# Patient Record
Sex: MALE | Race: WHITE | Employment: OTHER | URBAN - METROPOLITAN AREA
[De-identification: names, ages, dates, MRNs, and addresses within clinical notes are randomized per-mention and may not be internally consistent; named-entity substitution may affect disease eponyms.]

---

## 2017-02-20 ENCOUNTER — HOSPITAL ENCOUNTER (EMERGENCY)
Age: 72
Discharge: HOME | End: 2017-02-20
Attending: EMERGENCY MEDICINE
Payer: MEDICARE

## 2017-02-20 ENCOUNTER — APPOINTMENT (OUTPATIENT)
Dept: GENERAL RADIOLOGY | Age: 72
End: 2017-02-20
Attending: EMERGENCY MEDICINE
Payer: MEDICARE

## 2017-02-20 VITALS
DIASTOLIC BLOOD PRESSURE: 90 MMHG | SYSTOLIC BLOOD PRESSURE: 155 MMHG | WEIGHT: 175 LBS | TEMPERATURE: 97.3 F | BODY MASS INDEX: 25.92 KG/M2 | HEIGHT: 69 IN | RESPIRATION RATE: 18 BRPM | HEART RATE: 60 BPM | OXYGEN SATURATION: 98 %

## 2017-02-20 DIAGNOSIS — T07.XXXA ABRASIONS OF MULTIPLE SITES: ICD-10-CM

## 2017-02-20 DIAGNOSIS — S63.259A DISLOCATED FINGER, INITIAL ENCOUNTER: Primary | ICD-10-CM

## 2017-02-20 PROCEDURE — 77030008326 HC SPLNT FNGR PLSTL DERY -A

## 2017-02-20 PROCEDURE — 73140 X-RAY EXAM OF FINGER(S): CPT

## 2017-02-20 PROCEDURE — 99284 EMERGENCY DEPT VISIT MOD MDM: CPT

## 2017-02-20 PROCEDURE — 74011250636 HC RX REV CODE- 250/636: Performed by: EMERGENCY MEDICINE

## 2017-02-20 PROCEDURE — 74011000250 HC RX REV CODE- 250

## 2017-02-20 PROCEDURE — 90471 IMMUNIZATION ADMIN: CPT

## 2017-02-20 PROCEDURE — 90715 TDAP VACCINE 7 YRS/> IM: CPT | Performed by: EMERGENCY MEDICINE

## 2017-02-20 PROCEDURE — 75810000303 HC CLSD TRMT  FRACTURE/DISLOCATION W/  ANES

## 2017-02-20 RX ORDER — SIMVASTATIN 10 MG/1
10 TABLET, FILM COATED ORAL
COMMUNITY
End: 2021-07-28

## 2017-02-20 RX ORDER — ASPIRIN 81 MG/1
81 TABLET ORAL DAILY
COMMUNITY
End: 2021-08-07

## 2017-02-20 RX ORDER — LIDOCAINE HYDROCHLORIDE 20 MG/ML
10 INJECTION, SOLUTION INFILTRATION; PERINEURAL
Status: DISCONTINUED | OUTPATIENT
Start: 2017-02-20 | End: 2017-02-20

## 2017-02-20 RX ORDER — LIDOCAINE HYDROCHLORIDE 20 MG/ML
10 INJECTION, SOLUTION EPIDURAL; INFILTRATION; INTRACAUDAL; PERINEURAL
Status: COMPLETED | OUTPATIENT
Start: 2017-02-20 | End: 2017-02-20

## 2017-02-20 RX ORDER — LIDOCAINE HYDROCHLORIDE 20 MG/ML
INJECTION, SOLUTION EPIDURAL; INFILTRATION; INTRACAUDAL; PERINEURAL
Status: COMPLETED
Start: 2017-02-20 | End: 2017-02-20

## 2017-02-20 RX ADMIN — LIDOCAINE HYDROCHLORIDE 200 MG: 20 INJECTION, SOLUTION EPIDURAL; INFILTRATION; INTRACAUDAL; PERINEURAL at 20:37

## 2017-02-20 RX ADMIN — TETANUS TOXOID, REDUCED DIPHTHERIA TOXOID AND ACELLULAR PERTUSSIS VACCINE, ADSORBED 0.5 ML: 5; 2.5; 8; 8; 2.5 SUSPENSION INTRAMUSCULAR at 20:36

## 2017-02-20 NOTE — ED TRIAGE NOTES
Sent here by Pt. First for possible L 3rd digit dislocation. No loss of sensation. + movement. Cap. Refill less than 3 seconds.
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
## 2017-02-21 NOTE — DISCHARGE INSTRUCTIONS
We hope that we have addressed all of your medical concerns. The examination and treatment you received in the Emergency Department were for an emergent problem and were not intended as complete care. It is important that you follow up with your healthcare provider(s) for ongoing care. If your symptoms worsen or do not improve as expected, and you are unable to reach your usual health care provider(s), you should return to the Emergency Department. Today's healthcare is undergoing tremendous change, and patient satisfaction surveys are one of the many tools to assess the quality of medical care. You may receive a survey from the [employer] regarding your experience in the Emergency Department. I hope that your experience has been completely positive, particularly the medical care that I provided. As such, please participate in the survey; anything less than excellent does not meet my expectations or intentions. [facility] and [address] [place] [address] participate in nationally recognized quality of care measures. If your blood pressure is greater than 120/80, as reported below, we urge that you seek medical care to address the potential of high blood pressure, commonly known as hypertension. Hypertension can be hereditary or can be caused by certain medical conditions, pain, stress, or \"white coat syndrome. \"       Please make an appointment with your health care provider(s) for follow up of your Emergency Department visit. VITALS:   Patient Vitals for the past 8 hrs:   Temp Pulse Resp BP SpO2   02/20/17 2128 - 60 18 155/90 98 %   02/20/17 1824 97.3 °F (36.3 °C) (!) 56 16 162/89 97 %          Thank you for allowing us to provide you with medical care today. We realize that you have many choices for your emergency care needs. Please choose us in the future for any continued health care needs. Judy Magaña, [address] [place]: [phone]            No results found for this or any previous visit (from the past 24 hour(s)). Xr 3rd Finger Lt Min 2 V    Result Date: 2/20/2017  INDICATION:  reduction Exam: AP, lateral, oblique views of the left hand. There is near anatomic alignment status post closed reduction of left third digit PIP dislocation. IMPRESSION: Interval reduction of left third digit PIP dislocation. Xr 3rd Finger Lt Min 2 V    Result Date: 2/20/2017  INDICATION: trauma, possible left third digit dislocation. Exam: AP, lateral, oblique views of the left third digit. FINDINGS: The left third digit middle phalanx is medially and dorsally dislocated with respect to the proximal phalanx. No acute fracture is visualized. Osseous structures are diffusely demineralized. IMPRESSION: Left digit PIP dislocation. Dislocated Finger: Care Instructions  Your Care Instructions  When the bones of a finger are forced out of their normal position, it is called a dislocated finger. This can happen when a finger jams or bends backward. It is common during sports. A doctor can put your finger back in its normal position. You probably knew that something was wrong with your finger right away. This is because a dislocated finger usually hurts a lot. And it doesn't look straight. Your doctor may have put a splint on the finger. This will keep it in position while it heals. Your doctor may also recommend exercises to strengthen your finger. Rest and home treatment can also help you get better. If you damaged bones or muscles, you may need more treatment. Follow-up care is a key part of your treatment and safety. Be sure to make and go to all appointments, and call your doctor if you are having problems. It's also a good idea to know your test results and keep a list of the medicines you take. How can you care for yourself at home?   · If your doctor put a splint on your finger, wear it as directed. Do not remove it until your doctor says you can. · Do not do anything that makes the pain worse. · If your finger is swollen, put ice or a cold pack on it for 10 to 20 minutes at a time. Try to do this every 1 to 2 hours for the next 3 days (when you are awake) or until the swelling goes down. Put a thin cloth between the ice and your skin. · Prop up your hand on a pillow when you ice it or anytime you sit or lie down during the next 3 days. Try to keep it above the level of your heart. This will help reduce swelling. · Take your medicines exactly as prescribed. Call your doctor if you think you are having a problem with your medicine. · Ask your doctor if you can take an over-the-counter pain medicine, such as acetaminophen (Tylenol), ibuprofen (Advil, Motrin), or naproxen (Aleve). Be safe with medicines. Read and follow all instructions on the label. · If your doctor recommends exercises, do them as directed. When should you call for help? Call your doctor now or seek immediate medical care if:  · You have signs that your finger may be dislocated again, such as:  ¨ Severe pain. ¨ A finger that looks like a bone is out of position. ¨ Not being able to bend or straighten your finger. · Your finger or hand is cool or pale or changes color. · You cannot feel or move your finger. Watch closely for changes in your health, and be sure to contact your doctor if:  · You do not get better as expected. Where can you learn more? Go to http://awilda-sukhdeep.info/. Enter R117 in the search box to learn more about \"Dislocated Finger: Care Instructions. \"  Current as of: May 23, 2016  Content Version: 11.1  © 0320-5282 [employer]. Care instructions adapted under license by [employer] (which disclaims liability or warranty for this information).  If you have questions about a medical condition or this instruction, always ask your healthcare professional. [address] any warranty or liability for your use of this information. Scrapes (Abrasions): Care Instructions  Your Care Instructions  Scrapes (abrasions) are wounds where your skin has been rubbed or torn off. Most scrapes do not go deep into the skin, but some may remove several layers of skin. Scrapes usually don't bleed much, but they may ooze pinkish fluid. Scrapes on the head or face may appear worse than they are. They may bleed a lot because of the good blood supply to this area. Most scrapes heal well and may not need a bandage. They usually heal within 3 to 7 days. A large, deep scrape may take 1 to 2 weeks or longer to heal. A scab may form on some scrapes. Follow-up care is a key part of your treatment and safety. Be sure to make and go to all appointments, and call your doctor if you are having problems. It's also a good idea to know your test results and keep a list of the medicines you take. How can you care for yourself at home? · If your doctor told you how to care for your wound, follow your doctor's instructions. If you did not get instructions, follow this general advice:  ¨ Wash the scrape with clean water 2 times a day. Don't use hydrogen peroxide or alcohol, which can slow healing. ¨ You may cover the scrape with a thin layer of petroleum jelly, such as Vaseline, and a nonstick bandage. ¨ Apply more petroleum jelly and replace the bandage as needed. · Prop up the injured area on a pillow anytime you sit or lie down during the next 3 days. Try to keep it above the level of your heart. This will help reduce swelling. · Be safe with medicines. Take pain medicines exactly as directed. ¨ If the doctor gave you a prescription medicine for pain, take it as prescribed. ¨ If you are not taking a prescription pain medicine, ask your doctor if you can take an over-the-counter medicine. When should you call for help?   Call your doctor now or seek immediate medical care if:  · You have signs of infection, such as:  ¨ Increased pain, swelling, warmth, or redness around the scrape. ¨ Red streaks leading from the scrape. ¨ Pus draining from the scrape. ¨ A fever. · The scrape starts to bleed, and blood soaks through the bandage. Oozing small amounts of blood is normal.  Watch closely for changes in your health, and be sure to contact your doctor if the scrape is not getting better each day. Where can you learn more? Go to http://awilda-sukhdeep.info/. Enter A374 in the search box to learn more about \"Scrapes (Abrasions): Care Instructions. \"  Current as of: May 27, 2016  Content Version: 11.1  © 6647-5705 [email or web address]. Care instructions adapted under license by [employer] (which disclaims liability or warranty for this information). If you have questions about a medical condition or this instruction, always ask your healthcare professional. [address] any warranty or liability for your use of this information.

## 2017-02-21 NOTE — ED NOTES
Pt 's wounds/abrasions cleansed with wound care cleanser. Pt's 3rd digit of L hand was splinted. Neurovascular intact before and after. Checked by MD.    I have reviewed discharge instructions. Paperwork given by provider and reviewed with patient; opportunity for questions given. Pt confirmed understanding of discharge instructions and of need for follow up with primary care provider. Pt left walking . Pt left with all personal belongings. Pt family/friends are present. Pt is alert and oriented x 4. Pt is medically stable and is in no current distress. Chief complaint was addressed and improved.

## 2021-04-28 ENCOUNTER — TRANSCRIBE ORDER (OUTPATIENT)
Dept: SCHEDULING | Age: 76
End: 2021-04-28

## 2021-04-28 DIAGNOSIS — M17.12 PRIMARY OSTEOARTHRITIS OF LEFT KNEE: Primary | ICD-10-CM

## 2021-05-03 ENCOUNTER — HOSPITAL ENCOUNTER (OUTPATIENT)
Dept: CT IMAGING | Age: 76
Discharge: HOME | End: 2021-05-03
Attending: ORTHOPAEDIC SURGERY
Payer: MEDICARE

## 2021-05-03 DIAGNOSIS — M17.12 PRIMARY OSTEOARTHRITIS OF LEFT KNEE: ICD-10-CM

## 2021-05-03 PROCEDURE — 73700 CT LOWER EXTREMITY W/O DYE: CPT

## 2021-07-12 NOTE — PROGRESS NOTES
Call to Dr. Bull Baltazar office (Cardiology) to request notes from LOV and EKG; to be faxed to [facility]

## 2021-07-26 ENCOUNTER — ANESTHESIA EVENT (OUTPATIENT)
Dept: SURGERY | Age: 76
End: 2021-07-26
Payer: MEDICARE

## 2021-07-28 ENCOUNTER — HOSPITAL ENCOUNTER (OUTPATIENT)
Dept: PREADMISSION TESTING | Age: 76
Discharge: HOME | End: 2021-07-28
Payer: MEDICARE

## 2021-07-28 VITALS
SYSTOLIC BLOOD PRESSURE: 129 MMHG | RESPIRATION RATE: 16 BRPM | OXYGEN SATURATION: 95 % | HEIGHT: 68 IN | HEART RATE: 60 BPM | BODY MASS INDEX: 27.87 KG/M2 | TEMPERATURE: 97.3 F | DIASTOLIC BLOOD PRESSURE: 76 MMHG | WEIGHT: 183.9 LBS

## 2021-07-28 LAB
ALBUMIN SERPL-MCNC: 3.3 G/DL (ref 3.5–5)
ALBUMIN/GLOB SERPL: 0.9 {RATIO} (ref 1.1–2.2)
ALP SERPL-CCNC: 64 U/L (ref 45–117)
ALT SERPL-CCNC: 12 U/L (ref 12–78)
ANION GAP SERPL CALC-SCNC: 2 MMOL/L (ref 5–15)
APPEARANCE UR: CLEAR
AST SERPL-CCNC: 18 U/L (ref 15–37)
BACTERIA URNS QL MICRO: NEGATIVE /HPF
BASOPHILS # BLD: 0 K/UL (ref 0–0.1)
BASOPHILS NFR BLD: 0 % (ref 0–1)
BILIRUB SERPL-MCNC: 0.5 MG/DL (ref 0.2–1)
BILIRUB UR QL: NEGATIVE
BUN SERPL-MCNC: 22 MG/DL (ref 6–20)
BUN/CREAT SERPL: 20 (ref 12–20)
CALCIUM SERPL-MCNC: 8.4 MG/DL (ref 8.5–10.1)
CHLORIDE SERPL-SCNC: 110 MMOL/L (ref 97–108)
CO2 SERPL-SCNC: 29 MMOL/L (ref 21–32)
COLOR UR: NORMAL
CREAT SERPL-MCNC: 1.12 MG/DL (ref 0.7–1.3)
CRP SERPL-MCNC: <0.29 MG/DL (ref 0–0.6)
DIFFERENTIAL METHOD BLD: ABNORMAL
EOSINOPHIL # BLD: 0.1 K/UL (ref 0–0.4)
EOSINOPHIL NFR BLD: 1 % (ref 0–7)
EPITH CASTS URNS QL MICRO: NORMAL /LPF
ERYTHROCYTE [DISTWIDTH] IN BLOOD BY AUTOMATED COUNT: 13.4 % (ref 11.5–14.5)
ERYTHROCYTE [SEDIMENTATION RATE] IN BLOOD: 8 MM/HR (ref 0–20)
EST. AVERAGE GLUCOSE BLD GHB EST-MCNC: 108 MG/DL
GLOBULIN SER CALC-MCNC: 3.5 G/DL (ref 2–4)
GLUCOSE SERPL-MCNC: 69 MG/DL (ref 65–100)
GLUCOSE UR STRIP.AUTO-MCNC: NEGATIVE MG/DL
HBA1C MFR BLD: 5.4 % (ref 4–5.6)
HCT VFR BLD AUTO: 46.1 % (ref 36.6–50.3)
HGB BLD-MCNC: 15.3 G/DL (ref 12.1–17)
HGB UR QL STRIP: NEGATIVE
HYALINE CASTS URNS QL MICRO: NORMAL /LPF (ref 0–5)
IMM GRANULOCYTES # BLD AUTO: 0 K/UL (ref 0–0.04)
IMM GRANULOCYTES NFR BLD AUTO: 0 % (ref 0–0.5)
KETONES UR QL STRIP.AUTO: NEGATIVE MG/DL
LEUKOCYTE ESTERASE UR QL STRIP.AUTO: NEGATIVE
LYMPHOCYTES # BLD: 2.1 K/UL (ref 0.8–3.5)
LYMPHOCYTES NFR BLD: 27 % (ref 12–49)
MCH RBC QN AUTO: 33.8 PG (ref 26–34)
MCHC RBC AUTO-ENTMCNC: 33.2 G/DL (ref 30–36.5)
MCV RBC AUTO: 101.8 FL (ref 80–99)
MONOCYTES # BLD: 0.8 K/UL (ref 0–1)
MONOCYTES NFR BLD: 11 % (ref 5–13)
NEUTS SEG # BLD: 4.7 K/UL (ref 1.8–8)
NEUTS SEG NFR BLD: 61 % (ref 32–75)
NITRITE UR QL STRIP.AUTO: NEGATIVE
NRBC # BLD: 0 K/UL (ref 0–0.01)
NRBC BLD-RTO: 0 PER 100 WBC
PH UR STRIP: 6 [PH] (ref 5–8)
PLATELET # BLD AUTO: 246 K/UL (ref 150–400)
PMV BLD AUTO: 9.4 FL (ref 8.9–12.9)
POTASSIUM SERPL-SCNC: 5 MMOL/L (ref 3.5–5.1)
PROT SERPL-MCNC: 6.8 G/DL (ref 6.4–8.2)
PROT UR STRIP-MCNC: NEGATIVE MG/DL
RBC # BLD AUTO: 4.53 M/UL (ref 4.1–5.7)
RBC #/AREA URNS HPF: NORMAL /HPF (ref 0–5)
SODIUM SERPL-SCNC: 141 MMOL/L (ref 136–145)
SP GR UR REFRACTOMETRY: 1.02 (ref 1–1.03)
UA: UC IF INDICATED,UAUC: NORMAL
UROBILINOGEN UR QL STRIP.AUTO: 0.2 EU/DL (ref 0.2–1)
WBC # BLD AUTO: 7.8 K/UL (ref 4.1–11.1)
WBC URNS QL MICRO: NORMAL /HPF (ref 0–4)

## 2021-07-28 PROCEDURE — 83036 HEMOGLOBIN GLYCOSYLATED A1C: CPT

## 2021-07-28 PROCEDURE — 80053 COMPREHEN METABOLIC PANEL: CPT

## 2021-07-28 PROCEDURE — 85652 RBC SED RATE AUTOMATED: CPT

## 2021-07-28 PROCEDURE — 86140 C-REACTIVE PROTEIN: CPT

## 2021-07-28 PROCEDURE — 36415 COLL VENOUS BLD VENIPUNCTURE: CPT

## 2021-07-28 PROCEDURE — 84466 ASSAY OF TRANSFERRIN: CPT

## 2021-07-28 PROCEDURE — 81001 URINALYSIS AUTO W/SCOPE: CPT

## 2021-07-28 PROCEDURE — 85025 COMPLETE CBC W/AUTO DIFF WBC: CPT

## 2021-07-28 RX ORDER — ACETAMINOPHEN 500 MG
500 TABLET ORAL
COMMUNITY
End: 2021-08-07

## 2021-07-28 RX ORDER — LEVOTHYROXINE SODIUM 25 UG/1
25 TABLET ORAL
COMMUNITY

## 2021-07-28 RX ORDER — CHOLECALCIFEROL (VITAMIN D3) 125 MCG
220 CAPSULE ORAL AS NEEDED
COMMUNITY
End: 2021-08-07

## 2021-07-28 RX ORDER — BISMUTH SUBSALICYLATE 262 MG
1 TABLET,CHEWABLE ORAL DAILY
COMMUNITY

## 2021-07-28 RX ORDER — ROSUVASTATIN CALCIUM 10 MG/1
10 TABLET, COATED ORAL DAILY
COMMUNITY

## 2021-07-28 RX ORDER — IBUPROFEN 400 MG/1
400 TABLET ORAL
COMMUNITY
End: 2021-08-07

## 2021-07-28 RX ORDER — IBUPROFEN, ACETAMINOPHEN 125; 250 MG/1; MG/1
1 TABLET, FILM COATED ORAL AS NEEDED
COMMUNITY
End: 2021-08-07

## 2021-07-28 NOTE — H&P
Preoperative Evaluation                     History and Physical with Surgical Risk Stratification     7/28/2021    CC: left knee pain    HPI:   Malorie Lipscomb is a 67 y.o. male referred for pre-operative evaluation by Dr. Manan Carl for surgery on 8/5/21. Arias Clark notes he has had knee pain for many years but in February the pain became worse. He notes now the pain is \"unbearable\". He notes PT has improved his knees slightly with strength. Pain radiates behind his knee and up into his thigh. Denies swelling or buckling. No falls. He is followed by Dr. Arvin Sadler for his heart health. We have clearance on chart. The patient was evaluated in the surgeon's office and it was determined that the most appropriate plan of care is to proceed with surgical intervention. Patient's PCP Lauren Edmondson MD    Review of Systems     Constitutional: Negative for chills and fever  Throat: Negative for congestion and sore throat  Eyes: Negative for blurred vision and double vision  Respiratory: Negative for cough, shortness of breath and wheezing  Mouth: Negative for loose, broken or chipped teeth. Cardiovascular: Negative for chest pain and palpitations  Gastrointestinal: Negative for abdominal pain, nausea, diarrhea & constipation  Genitourinary: Negative for dysuria and hematuria  Musculoskeletal: Left knee pain  Skin: Negative for rash, open wounds.    Neurological: Negative for dizziness, headaches, tremors  Psychiatric: Negative for anxiety    Inherent Risk of Surgery     Surgical risk:  Intermediate  Intermediate:  Joint Replacement, Spinal surgery, abdominal, thoracic, carotid endarterctomy, prostate, head and neck    Patient Cardiac Risk Assessment     Revised Cardiac Risk Index (RCRI)    Rate if cardiac death, nonfatal MI, nonfatal cardiac arrest by number of risk factor- 0.4%    SHAHEEN/AHA 2007 Guidelines:   1) Surgery Emergency, Non-cardiac -> to surgery  2) If not, look at clinical predictors    Intermediate Minor     Blood Thinner: ASA    METS      EQUAL TO 4 Care for self Walk indoors around house Walk 2-3 blocks on level ground (2-3 mph) Light work around house (dust, dishes)     Other Risk Factors:   Screening for ETOH use:  Done and low risk  Smoking status:  Former   Marijuana Use:  No    Personal or FH of bleeding problems:  No  Personal or FH of blood clots:  No  Personal or FH of anesthesia problems:   No    Pulmonary Risk:  Asthma or COPD:  No  Body mass index is 27.96 kg/m². Known ESTHER:  No    Past Medical, Surgical, Social History     Allergies: No Known Allergies      Current Outpatient Medications:     rosuvastatin (CRESTOR) 10 mg tablet, Take 10 mg by mouth daily. , Disp: , Rfl:     levothyroxine (Euthyrox) 25 mcg tablet, Take 25 mcg by mouth Daily (before breakfast). , Disp: , Rfl:     COQ10, UBIQUINOL, PO, Take 200 mg by mouth., Disp: , Rfl:     multivitamin (ONE A DAY) tablet, Take 1 Tablet by mouth daily. , Disp: , Rfl:     ibuprofen (MOTRIN) 400 mg tablet, Take 400 mg by mouth every six (6) hours as needed for Pain., Disp: , Rfl:     acetaminophen (Tylenol Extra Strength) 500 mg tablet, Take 500 mg by mouth every six (6) hours as needed for Pain., Disp: , Rfl:     ibuprofen-acetaminophen (AdviL Dual Action) 125-250 mg tab, Take 1 Tablet by mouth as needed. , Disp: , Rfl:     naproxen sodium (Aleve) 220 mg cap, Take 220 mg by mouth as needed. , Disp: , Rfl:     aspirin delayed-release 81 mg tablet, Take 81 mg by mouth daily. , Disp: , Rfl:      Past Medical History:   Diagnosis Date    COVID-19 vaccine series completed 2020    High cholesterol     Hypothyroidism      Past Surgical History:   Procedure Laterality Date    HX COLONOSCOPY      HX VASECTOMY       Social History     Tobacco Use    Smoking status: Former Smoker     Types: Cigarettes    Smokeless tobacco: Never Used   Substance Use Topics    Alcohol use:  Yes     Alcohol/week: 3.0 standard drinks Types: 3 Cans of beer per week    Drug use: Never     No family history on file. Objective     Vitals:    07/28/21 0814   BP: 129/76   Pulse: 60   Resp: 16   Temp: 97.3 °F (36.3 °C)   SpO2: 95%   Weight: 83.4 kg (183 lb 14.4 oz)   Height: 5' 8\" (1.727 m)       General Appearance: Alert, Well Appearing and in no distress  Mental Status: Normal mood, behavior, speech and dress  Neck: Normal appearance externally  Ears: External canal no drainage  Nose: Normal external appearance and no drainage   Chest: Clear to auscultation, no wheezes, rales or rhonchi  Heart: Normal rate, regular rhythm, no murmurs, rubs, clicks or gallops  Skin: Normal color, no lesions externally  Abdomen: Not examined  Neuro: Not examined  Musculoskeletal: Gait antalgic     Recent Results (from the past 168 hour(s))   C REACTIVE PROTEIN, QT    Collection Time: 07/28/21  9:10 AM   Result Value Ref Range    C-Reactive protein <0.29 0.00 - 0.60 mg/dL   CBC WITH AUTOMATED DIFF    Collection Time: 07/28/21  9:10 AM   Result Value Ref Range    WBC 7.8 4.1 - 11.1 K/uL    RBC 4.53 4.10 - 5.70 M/uL    HGB 15.3 12.1 - 17.0 g/dL    HCT 46.1 36.6 - 50.3 %    .8 (H) 80.0 - 99.0 FL    MCH 33.8 26.0 - 34.0 PG    MCHC 33.2 30.0 - 36.5 g/dL    RDW 13.4 11.5 - 14.5 %    PLATELET 829 639 - 185 K/uL    MPV 9.4 8.9 - 12.9 FL    NRBC 0.0 0  WBC    ABSOLUTE NRBC 0.00 0.00 - 0.01 K/uL    NEUTROPHILS 61 32 - 75 %    LYMPHOCYTES 27 12 - 49 %    MONOCYTES 11 5 - 13 %    EOSINOPHILS 1 0 - 7 %    BASOPHILS 0 0 - 1 %    IMMATURE GRANULOCYTES 0 0.0 - 0.5 %    ABS. NEUTROPHILS 4.7 1.8 - 8.0 K/UL    ABS. LYMPHOCYTES 2.1 0.8 - 3.5 K/UL    ABS. MONOCYTES 0.8 0.0 - 1.0 K/UL    ABS. EOSINOPHILS 0.1 0.0 - 0.4 K/UL    ABS. BASOPHILS 0.0 0.0 - 0.1 K/UL    ABS. IMM.  GRANS. 0.0 0.00 - 0.04 K/UL    DF AUTOMATED     METABOLIC PANEL, COMPREHENSIVE    Collection Time: 07/28/21  9:10 AM   Result Value Ref Range    Sodium 141 136 - 145 mmol/L    Potassium 5.0 3.5 - 5.1 mmol/L    Chloride 110 (H) 97 - 108 mmol/L    CO2 29 21 - 32 mmol/L    Anion gap 2 (L) 5 - 15 mmol/L    Glucose 69 65 - 100 mg/dL    BUN 22 (H) 6 - 20 MG/DL    Creatinine 1.12 0.70 - 1.30 MG/DL    BUN/Creatinine ratio 20 12 - 20      GFR est AA >60 >60 ml/min/1.73m2    GFR est non-AA >60 >60 ml/min/1.73m2    Calcium 8.4 (L) 8.5 - 10.1 MG/DL    Bilirubin, total 0.5 0.2 - 1.0 MG/DL    ALT (SGPT) 12 12 - 78 U/L    AST (SGOT) 18 15 - 37 U/L    Alk. phosphatase 64 45 - 117 U/L    Protein, total 6.8 6.4 - 8.2 g/dL    Albumin 3.3 (L) 3.5 - 5.0 g/dL    Globulin 3.5 2.0 - 4.0 g/dL    A-G Ratio 0.9 (L) 1.1 - 2.2     HEMOGLOBIN A1C WITH EAG    Collection Time: 07/28/21  9:10 AM   Result Value Ref Range    Hemoglobin A1c 5.4 4.0 - 5.6 %    Est. average glucose 108 mg/dL   CULTURE, MRSA    Collection Time: 07/28/21  9:10 AM    Specimen: Nares; Nasal   Result Value Ref Range    Special Requests: NO SPECIAL REQUESTS      Culture result: MRSA NOT PRESENT      Culture result:        Screening of patient nares for MRSA is for surveillance purposes and, if positive, to facilitate isolation considerations in high risk settings. It is not intended for automatic decolonization interventions per se as regimens are not sufficiently effective to warrant routine use.    SED RATE (ESR)    Collection Time: 07/28/21  9:10 AM   Result Value Ref Range    Sed rate, automated 8 0 - 20 mm/hr   TRANSFERRIN    Collection Time: 07/28/21  9:10 AM   Result Value Ref Range    Transferrin 218 177 - 329 mg/dL   URINALYSIS W/ REFLEX CULTURE    Collection Time: 07/28/21  9:10 AM    Specimen: Urine   Result Value Ref Range    Color YELLOW/STRAW      Appearance CLEAR CLEAR      Specific gravity 1.022 1.003 - 1.030      pH (UA) 6.0 5.0 - 8.0      Protein Negative NEG mg/dL    Glucose Negative NEG mg/dL    Ketone Negative NEG mg/dL    Bilirubin Negative NEG      Blood Negative NEG      Urobilinogen 0.2 0.2 - 1.0 EU/dL    Nitrites Negative NEG      Leukocyte Esterase Negative NEG      WBC 0-4 0 - 4 /hpf    RBC 0-5 0 - 5 /hpf    Epithelial cells FEW FEW /lpf    Bacteria Negative NEG /hpf    UA:UC IF INDICATED CULTURE NOT INDICATED BY UA RESULT CNI      Hyaline cast 0-2 0 - 5 /lpf       Assessment and Plan     Assessment/Plan:   1) OA left Knee       Pre-Operative Evaluation    Plan:  Left knee replacement  Labs and EKG reviewed. MRSA negative    Preoperative Risk Stratification:    Per RCRI, the patient has a 0.4% risk of cardiac death, nonfatal MI, nonfatal cardiac arrest based on no risk factors. Per ACC/AHA guidelines, patient is low risk for a(n) intermediate risk surgery and may proceed to planned surgery with the above noted risk.     Nieves Kim NP

## 2021-07-28 NOTE — PERIOP NOTES
[address] [place] [address] [address], [address] [facility]   MAIN OR                                  [phone]   MAIN PRE OP                          [phone]                                                                                AMBULATORY PRE OP          [phone]  PRE-ADMISSION TESTING    [phone]   Surgery Date: Thursday, 8/5/21       Is surgery arrival time given by surgeon? YES  NO  If NO, [facility] staff will call you between 3 and 7pm the day before your surgery with your arrival time. (If your surgery is on a Monday, we will call you the Friday before.)    Call [phone] after 7pm Monday-Friday if you did not receive this call. INSTRUCTIONS BEFORE YOUR SURGERY   When You  Arrive Arrive at the [address]nd [address] [facility] on the day of your surgery  Have your insurance card, photo ID, and any copayment (if needed)   Food   and   Drink NO food or drink after midnight the night before surgery    This means NO water, gum, mints, coffee, juice, etc.  No alcohol (beer, wine, liquor) 24 hours before and after surgery   Medications to   TAKE   Morning of Surgery MEDICATIONS TO TAKE THE MORNING OF SURGERY WITH A SIP OF WATER:    Euthyrox   Medications  To  STOP      7 days before surgery  Non-Steroidal anti-inflammatory Drugs (NSAID's): for example, Ibuprofen (Advil, Motrin), Naproxen (Aleve)   Aspirin, if taking for pain    Herbal supplements, vitamins, and fish oil   Other:  (Pain medications not listed above, including Tylenol may be taken)  Please stop taking CQ10, multivitamin after 7/29/21 until after surgery. Blood  Thinners  If you take  Aspirin, Plavix, Coumadin, or any blood-thinning or anti-blood clot medicine, talk to the doctor who prescribed the medications for pre-operative instructions.  Please stop taking Aspirin after 7/29/21 until after surgery.    Bathing Clothing  Jewelry  Valuables      If you shower the morning of surgery, please do not apply anything to your skin (lotions, powders, deodorant, or makeup, especially mascara)   Follow Chlorhexidine Care Fusion body wash instructions provided to you during PAT appointment. Begin 3 days prior to surgery.  Do not shave or trim anywhere 24 hours before surgery   Wear your hair loose or down; no pony-tails, buns, or metal hair clips   Wear loose, comfortable, clean clothes   Wear glasses instead of contacts  [employer] money, valuables, and jewelry, including body piercings, at home   If you were given an [employer], bring it on day of surgery. Going Home - or Spending the Night  SAME-DAY SURGERY: You must have a responsible adult drive you home and stay with you 24 hours after surgery   ADMITS: If your doctor is keeping you in the hospital after surgery, leave personal belongings/luggage in your car until you have a hospital room number. Hospital discharge time is 12 noon  Drivers must be here before 12 noon unless you are told differently   Special Instructions   Special Instructions:  · Use Chlorhexidine Care Fusion wash and sponges 3 days prior to surgery as instructed. · Incentive spirometer given with instructions to practice at home and bring back to the hospital on the day of surgery. · [facility] will contact you if your Hemoglobin A1C is greater than 7.5. · Ensure/Glucerna  sample, nutritional information, and Ensure/Glucerna coupon given. · Pain pamphlet and Call Don't Fall reminder reviewed with patient. ·  parking is complimentary Monday - Friday 7 am - 5 pm  · Bring PTA Medication list day of surgery with the last doses taken documented  ·        Follow all instructions so your surgery wont be cancelled. Please, be on time. If a situation occurs and you are delayed the day of surgery, call [phone].     If your physical condition changes (like a fever, cold, flu, etc.) call your surgeon. Home medication(s) reviewed and verified via     LIST      during PAT appointment. The patient was contacted by    IN-PERSON  The patient verbalizes understanding of all instructions and    DOES NOT   need reinforcement.

## 2021-07-29 LAB
BACTERIA SPEC CULT: NORMAL
BACTERIA SPEC CULT: NORMAL
SERVICE CMNT-IMP: NORMAL
TRANSFERRIN SERPL-MCNC: 218 MG/DL (ref 177–329)

## 2021-08-05 ENCOUNTER — ANESTHESIA (OUTPATIENT)
Dept: SURGERY | Age: 76
End: 2021-08-05
Payer: MEDICARE

## 2021-08-05 ENCOUNTER — APPOINTMENT (OUTPATIENT)
Dept: GENERAL RADIOLOGY | Age: 76
End: 2021-08-05
Attending: PHYSICIAN ASSISTANT
Payer: MEDICARE

## 2021-08-05 ENCOUNTER — HOSPITAL ENCOUNTER (OUTPATIENT)
Age: 76
Setting detail: OBSERVATION
Discharge: HOME | End: 2021-08-07
Attending: ORTHOPAEDIC SURGERY | Admitting: ORTHOPAEDIC SURGERY
Payer: MEDICARE

## 2021-08-05 DIAGNOSIS — M17.12 ARTHRITIS OF LEFT KNEE: Primary | ICD-10-CM

## 2021-08-05 PROCEDURE — 74011000250 HC RX REV CODE- 250: Performed by: ANESTHESIOLOGY

## 2021-08-05 PROCEDURE — 74011250637 HC RX REV CODE- 250/637: Performed by: ORTHOPAEDIC SURGERY

## 2021-08-05 PROCEDURE — 77030040393 HC DRSG OPTIFOAM GENT MDII -B: Performed by: ORTHOPAEDIC SURGERY

## 2021-08-05 PROCEDURE — 99218 HC RM OBSERVATION: CPT

## 2021-08-05 PROCEDURE — 77030040922 HC BLNKT HYPOTHRM STRY -A

## 2021-08-05 PROCEDURE — 77030018723 HC ELCTRD BLD COVD -A: Performed by: ORTHOPAEDIC SURGERY

## 2021-08-05 PROCEDURE — 74011000250 HC RX REV CODE- 250: Performed by: ORTHOPAEDIC SURGERY

## 2021-08-05 PROCEDURE — 77030029830 HC FEM INST CKPNT DISP STRY -B: Performed by: ORTHOPAEDIC SURGERY

## 2021-08-05 PROCEDURE — 74011250636 HC RX REV CODE- 250/636: Performed by: ANESTHESIOLOGY

## 2021-08-05 PROCEDURE — 77030040361 HC SLV COMPR DVT MDII -B

## 2021-08-05 PROCEDURE — C1776 JOINT DEVICE (IMPLANTABLE): HCPCS | Performed by: ORTHOPAEDIC SURGERY

## 2021-08-05 PROCEDURE — 77030020263 HC SOL INJ SOD CL0.9% LFCR 1000ML: Performed by: ORTHOPAEDIC SURGERY

## 2021-08-05 PROCEDURE — 74011250636 HC RX REV CODE- 250/636: Performed by: ORTHOPAEDIC SURGERY

## 2021-08-05 PROCEDURE — 77030028907 HC WRP KNEE WO BGS SOLM -B

## 2021-08-05 PROCEDURE — 77030035236 HC SUT PDS STRATFX BARB J&J -B: Performed by: ORTHOPAEDIC SURGERY

## 2021-08-05 PROCEDURE — 74011000250 HC RX REV CODE- 250: Performed by: PHYSICIAN ASSISTANT

## 2021-08-05 PROCEDURE — 74011250637 HC RX REV CODE- 250/637: Performed by: ANESTHESIOLOGY

## 2021-08-05 PROCEDURE — 74011000250 HC RX REV CODE- 250: Performed by: NURSE ANESTHETIST, CERTIFIED REGISTERED

## 2021-08-05 PROCEDURE — 74011250636 HC RX REV CODE- 250/636: Performed by: NURSE ANESTHETIST, CERTIFIED REGISTERED

## 2021-08-05 PROCEDURE — 74011250637 HC RX REV CODE- 250/637: Performed by: PHYSICIAN ASSISTANT

## 2021-08-05 PROCEDURE — 77030002933 HC SUT MCRYL J&J -A: Performed by: ORTHOPAEDIC SURGERY

## 2021-08-05 PROCEDURE — 76030000020 HC AMB SURG 1.5 TO 2 HR INTENSV-TIER 1: Performed by: ORTHOPAEDIC SURGERY

## 2021-08-05 PROCEDURE — 76210000036 HC AMBSU PH I REC 1.5 TO 2 HR: Performed by: ORTHOPAEDIC SURGERY

## 2021-08-05 PROCEDURE — 73560 X-RAY EXAM OF KNEE 1 OR 2: CPT

## 2021-08-05 PROCEDURE — 97116 GAIT TRAINING THERAPY: CPT

## 2021-08-05 PROCEDURE — 77030007866 HC KT SPN ANES BBMI -B: Performed by: ANESTHESIOLOGY

## 2021-08-05 PROCEDURE — 64447 NJX AA&/STRD FEMORAL NRV IMG: CPT | Performed by: ANESTHESIOLOGY

## 2021-08-05 PROCEDURE — 77030031139 HC SUT VCRL2 J&J -A: Performed by: ORTHOPAEDIC SURGERY

## 2021-08-05 PROCEDURE — 97161 PT EVAL LOW COMPLEX 20 MIN: CPT

## 2021-08-05 PROCEDURE — 76060000063 HC AMB SURG ANES 1.5 TO 2 HR: Performed by: ORTHOPAEDIC SURGERY

## 2021-08-05 PROCEDURE — 2709999900 HC NON-CHARGEABLE SUPPLY: Performed by: ORTHOPAEDIC SURGERY

## 2021-08-05 PROCEDURE — 77030003601 HC NDL NRV BLK BBMI -A: Performed by: ANESTHESIOLOGY

## 2021-08-05 PROCEDURE — 64445 NJX AA&/STRD SCIATIC NRV IMG: CPT | Performed by: ANESTHESIOLOGY

## 2021-08-05 PROCEDURE — 77030029820: Performed by: ORTHOPAEDIC SURGERY

## 2021-08-05 PROCEDURE — 77030038149 HC BLD SAW SAG STRY -D: Performed by: ORTHOPAEDIC SURGERY

## 2021-08-05 PROCEDURE — 77030041680 HC PNCL ELECSURG SMK EVAC CNMD -B: Performed by: ORTHOPAEDIC SURGERY

## 2021-08-05 PROCEDURE — 77030029829 HC TIB INST CKPNT DISP STRY -B: Performed by: ORTHOPAEDIC SURGERY

## 2021-08-05 PROCEDURE — 74011250636 HC RX REV CODE- 250/636: Performed by: PHYSICIAN ASSISTANT

## 2021-08-05 PROCEDURE — 77030006835 HC BLD SAW SAG STRY -B: Performed by: ORTHOPAEDIC SURGERY

## 2021-08-05 DEVICE — CRUCIATE RETAINING FEMORAL
Type: IMPLANTABLE DEVICE | Site: KNEE | Status: FUNCTIONAL
Brand: TRIATHLON

## 2021-08-05 DEVICE — IMPLANTABLE DEVICE: Type: IMPLANTABLE DEVICE | Site: KNEE | Status: FUNCTIONAL

## 2021-08-05 DEVICE — TIBIAL COMPONENT
Type: IMPLANTABLE DEVICE | Site: KNEE | Status: FUNCTIONAL
Brand: TRIATHLON

## 2021-08-05 DEVICE — PATELLA
Type: IMPLANTABLE DEVICE | Site: KNEE | Status: FUNCTIONAL
Brand: TRIATHLON

## 2021-08-05 DEVICE — KNEE K2 TOT HEMI ADV CMTLS -- IMPL CAPPED K2: Type: IMPLANTABLE DEVICE | Site: KNEE | Status: FUNCTIONAL

## 2021-08-05 RX ORDER — NALOXONE HYDROCHLORIDE 0.4 MG/ML
0.4 INJECTION, SOLUTION INTRAMUSCULAR; INTRAVENOUS; SUBCUTANEOUS AS NEEDED
Status: DISCONTINUED | OUTPATIENT
Start: 2021-08-05 | End: 2021-08-07 | Stop reason: HOSPADM

## 2021-08-05 RX ORDER — SODIUM CHLORIDE, SODIUM LACTATE, POTASSIUM CHLORIDE, CALCIUM CHLORIDE 600; 310; 30; 20 MG/100ML; MG/100ML; MG/100ML; MG/100ML
125 INJECTION, SOLUTION INTRAVENOUS CONTINUOUS
Status: DISCONTINUED | OUTPATIENT
Start: 2021-08-05 | End: 2021-08-05 | Stop reason: HOSPADM

## 2021-08-05 RX ORDER — IBUPROFEN 800 MG/1
800 TABLET ORAL
Qty: 50 TABLET | Refills: 2 | Status: ON HOLD | OUTPATIENT
Start: 2021-08-05 | End: 2022-04-22

## 2021-08-05 RX ORDER — SODIUM CHLORIDE 0.9 % (FLUSH) 0.9 %
5-40 SYRINGE (ML) INJECTION EVERY 8 HOURS
Status: DISCONTINUED | OUTPATIENT
Start: 2021-08-05 | End: 2021-08-05 | Stop reason: HOSPADM

## 2021-08-05 RX ORDER — SODIUM CHLORIDE 0.9 % (FLUSH) 0.9 %
5-40 SYRINGE (ML) INJECTION EVERY 8 HOURS
Status: DISCONTINUED | OUTPATIENT
Start: 2021-08-05 | End: 2021-08-07 | Stop reason: HOSPADM

## 2021-08-05 RX ORDER — FAMOTIDINE 20 MG/1
20 TABLET, FILM COATED ORAL 2 TIMES DAILY
Status: DISCONTINUED | OUTPATIENT
Start: 2021-08-05 | End: 2021-08-07 | Stop reason: HOSPADM

## 2021-08-05 RX ORDER — DIPHENHYDRAMINE HYDROCHLORIDE 50 MG/ML
12.5 INJECTION, SOLUTION INTRAMUSCULAR; INTRAVENOUS
Status: ACTIVE | OUTPATIENT
Start: 2021-08-05 | End: 2021-08-06

## 2021-08-05 RX ORDER — POVIDONE-IODINE 10 %
SOLUTION, NON-ORAL TOPICAL AS NEEDED
Status: DISCONTINUED | OUTPATIENT
Start: 2021-08-05 | End: 2021-08-05 | Stop reason: HOSPADM

## 2021-08-05 RX ORDER — ACETAMINOPHEN 500 MG
975 TABLET ORAL
Qty: 60 TABLET | Refills: 1 | Status: SHIPPED | OUTPATIENT
Start: 2021-08-05

## 2021-08-05 RX ORDER — ONDANSETRON 2 MG/ML
4 INJECTION INTRAMUSCULAR; INTRAVENOUS
Status: ACTIVE | OUTPATIENT
Start: 2021-08-05 | End: 2021-08-06

## 2021-08-05 RX ORDER — MIDAZOLAM HYDROCHLORIDE 1 MG/ML
INJECTION, SOLUTION INTRAMUSCULAR; INTRAVENOUS AS NEEDED
Status: DISCONTINUED | OUTPATIENT
Start: 2021-08-05 | End: 2021-08-05 | Stop reason: HOSPADM

## 2021-08-05 RX ORDER — CELECOXIB 100 MG/1
200 CAPSULE ORAL 2 TIMES DAILY
Status: DISCONTINUED | OUTPATIENT
Start: 2021-08-05 | End: 2021-08-07 | Stop reason: HOSPADM

## 2021-08-05 RX ORDER — OXYCODONE HYDROCHLORIDE 5 MG/1
5 TABLET ORAL
Status: DISCONTINUED | OUTPATIENT
Start: 2021-08-05 | End: 2021-08-07 | Stop reason: HOSPADM

## 2021-08-05 RX ORDER — ONDANSETRON 2 MG/ML
4 INJECTION INTRAMUSCULAR; INTRAVENOUS AS NEEDED
Status: DISCONTINUED | OUTPATIENT
Start: 2021-08-05 | End: 2021-08-05 | Stop reason: HOSPADM

## 2021-08-05 RX ORDER — AMOXICILLIN 250 MG
1 CAPSULE ORAL 2 TIMES DAILY
Status: DISCONTINUED | OUTPATIENT
Start: 2021-08-05 | End: 2021-08-07 | Stop reason: HOSPADM

## 2021-08-05 RX ORDER — SODIUM CHLORIDE 0.9 % (FLUSH) 0.9 %
5-40 SYRINGE (ML) INJECTION AS NEEDED
Status: DISCONTINUED | OUTPATIENT
Start: 2021-08-05 | End: 2021-08-05 | Stop reason: HOSPADM

## 2021-08-05 RX ORDER — CHOLECALCIFEROL (VITAMIN D3) 125 MCG
200 CAPSULE ORAL DAILY
Status: DISCONTINUED | OUTPATIENT
Start: 2021-08-06 | End: 2021-08-07 | Stop reason: HOSPADM

## 2021-08-05 RX ORDER — ALBUTEROL SULFATE 0.83 MG/ML
2.5 SOLUTION RESPIRATORY (INHALATION) AS NEEDED
Status: DISCONTINUED | OUTPATIENT
Start: 2021-08-05 | End: 2021-08-05 | Stop reason: HOSPADM

## 2021-08-05 RX ORDER — PREGABALIN 75 MG/1
75 CAPSULE ORAL ONCE
Status: COMPLETED | OUTPATIENT
Start: 2021-08-05 | End: 2021-08-05

## 2021-08-05 RX ORDER — LEVOTHYROXINE SODIUM 25 UG/1
25 TABLET ORAL
Status: DISCONTINUED | OUTPATIENT
Start: 2021-08-06 | End: 2021-08-07 | Stop reason: HOSPADM

## 2021-08-05 RX ORDER — DIPHENHYDRAMINE HYDROCHLORIDE 50 MG/ML
12.5 INJECTION, SOLUTION INTRAMUSCULAR; INTRAVENOUS AS NEEDED
Status: DISCONTINUED | OUTPATIENT
Start: 2021-08-05 | End: 2021-08-05 | Stop reason: HOSPADM

## 2021-08-05 RX ORDER — SODIUM CHLORIDE 9 MG/ML
125 INJECTION, SOLUTION INTRAVENOUS CONTINUOUS
Status: DISPENSED | OUTPATIENT
Start: 2021-08-05 | End: 2021-08-06

## 2021-08-05 RX ORDER — PHENYLEPHRINE HCL IN 0.9% NACL 0.4MG/10ML
SYRINGE (ML) INTRAVENOUS AS NEEDED
Status: DISCONTINUED | OUTPATIENT
Start: 2021-08-05 | End: 2021-08-05 | Stop reason: HOSPADM

## 2021-08-05 RX ORDER — FENTANYL CITRATE 50 UG/ML
25 INJECTION, SOLUTION INTRAMUSCULAR; INTRAVENOUS
Status: DISCONTINUED | OUTPATIENT
Start: 2021-08-05 | End: 2021-08-05 | Stop reason: HOSPADM

## 2021-08-05 RX ORDER — OXYCODONE HYDROCHLORIDE 5 MG/1
10 TABLET ORAL
Status: DISCONTINUED | OUTPATIENT
Start: 2021-08-05 | End: 2021-08-07 | Stop reason: HOSPADM

## 2021-08-05 RX ORDER — TRANEXAMIC ACID 100 MG/ML
INJECTION, SOLUTION INTRAVENOUS AS NEEDED
Status: DISCONTINUED | OUTPATIENT
Start: 2021-08-05 | End: 2021-08-05 | Stop reason: HOSPADM

## 2021-08-05 RX ORDER — NALOXONE HYDROCHLORIDE 0.4 MG/ML
0.04 INJECTION, SOLUTION INTRAMUSCULAR; INTRAVENOUS; SUBCUTANEOUS
Status: DISCONTINUED | OUTPATIENT
Start: 2021-08-05 | End: 2021-08-05 | Stop reason: HOSPADM

## 2021-08-05 RX ORDER — ASPIRIN 81 MG/1
81 TABLET ORAL 2 TIMES DAILY
Status: DISCONTINUED | OUTPATIENT
Start: 2021-08-05 | End: 2021-08-07 | Stop reason: HOSPADM

## 2021-08-05 RX ORDER — OXYCODONE HYDROCHLORIDE 5 MG/1
5 TABLET ORAL
Status: DISCONTINUED | OUTPATIENT
Start: 2021-08-05 | End: 2021-08-05 | Stop reason: HOSPADM

## 2021-08-05 RX ORDER — BUPIVACAINE HYDROCHLORIDE 5 MG/ML
INJECTION, SOLUTION EPIDURAL; INTRACAUDAL AS NEEDED
Status: DISCONTINUED | OUTPATIENT
Start: 2021-08-05 | End: 2021-08-05 | Stop reason: HOSPADM

## 2021-08-05 RX ORDER — OXYCODONE HYDROCHLORIDE 5 MG/1
5 TABLET ORAL
Qty: 42 TABLET | Refills: 0 | Status: SHIPPED | OUTPATIENT
Start: 2021-08-05 | End: 2021-08-12

## 2021-08-05 RX ORDER — LIDOCAINE HYDROCHLORIDE 10 MG/ML
0.1 INJECTION, SOLUTION EPIDURAL; INFILTRATION; INTRACAUDAL; PERINEURAL AS NEEDED
Status: DISCONTINUED | OUTPATIENT
Start: 2021-08-05 | End: 2021-08-05 | Stop reason: HOSPADM

## 2021-08-05 RX ORDER — ASPIRIN 81 MG/1
81 TABLET ORAL 2 TIMES DAILY
Qty: 60 TABLET | Refills: 0 | Status: ON HOLD | OUTPATIENT
Start: 2021-08-05 | End: 2022-04-22

## 2021-08-05 RX ORDER — ROSUVASTATIN CALCIUM 10 MG/1
10 TABLET, COATED ORAL DAILY
Status: DISCONTINUED | OUTPATIENT
Start: 2021-08-06 | End: 2021-08-07 | Stop reason: HOSPADM

## 2021-08-05 RX ORDER — PROPOFOL 10 MG/ML
INJECTION, EMULSION INTRAVENOUS
Status: DISCONTINUED | OUTPATIENT
Start: 2021-08-05 | End: 2021-08-05 | Stop reason: HOSPADM

## 2021-08-05 RX ORDER — SODIUM CHLORIDE 0.9 % (FLUSH) 0.9 %
5-40 SYRINGE (ML) INJECTION AS NEEDED
Status: DISCONTINUED | OUTPATIENT
Start: 2021-08-05 | End: 2021-08-07 | Stop reason: HOSPADM

## 2021-08-05 RX ORDER — ACETAMINOPHEN 325 MG/1
975 TABLET ORAL ONCE
Status: COMPLETED | OUTPATIENT
Start: 2021-08-05 | End: 2021-08-05

## 2021-08-05 RX ORDER — THERA TABS 400 MCG
1 TAB ORAL DAILY
Status: DISCONTINUED | OUTPATIENT
Start: 2021-08-06 | End: 2021-08-07 | Stop reason: HOSPADM

## 2021-08-05 RX ORDER — TRAMADOL HYDROCHLORIDE 50 MG/1
50 TABLET ORAL
Qty: 28 TABLET | Refills: 0 | Status: SHIPPED | OUTPATIENT
Start: 2021-08-05 | End: 2021-08-12

## 2021-08-05 RX ORDER — ROPIVACAINE HYDROCHLORIDE 2 MG/ML
INJECTION, SOLUTION EPIDURAL; INFILTRATION; PERINEURAL AS NEEDED
Status: DISCONTINUED | OUTPATIENT
Start: 2021-08-05 | End: 2021-08-05 | Stop reason: HOSPADM

## 2021-08-05 RX ORDER — FENTANYL CITRATE 50 UG/ML
INJECTION, SOLUTION INTRAMUSCULAR; INTRAVENOUS AS NEEDED
Status: DISCONTINUED | OUTPATIENT
Start: 2021-08-05 | End: 2021-08-05 | Stop reason: HOSPADM

## 2021-08-05 RX ORDER — ONDANSETRON 8 MG/1
4 TABLET, ORALLY DISINTEGRATING ORAL
Qty: 30 TABLET | Refills: 0 | Status: ON HOLD | OUTPATIENT
Start: 2021-08-05 | End: 2022-04-22

## 2021-08-05 RX ORDER — DIPHENHYDRAMINE HYDROCHLORIDE 50 MG/ML
INJECTION, SOLUTION INTRAMUSCULAR; INTRAVENOUS AS NEEDED
Status: DISCONTINUED | OUTPATIENT
Start: 2021-08-05 | End: 2021-08-05 | Stop reason: HOSPADM

## 2021-08-05 RX ORDER — OXYCODONE HCL 10 MG/1
10 TABLET, FILM COATED, EXTENDED RELEASE ORAL ONCE
Status: COMPLETED | OUTPATIENT
Start: 2021-08-05 | End: 2021-08-05

## 2021-08-05 RX ORDER — FLUMAZENIL 0.1 MG/ML
0.2 INJECTION INTRAVENOUS
Status: DISCONTINUED | OUTPATIENT
Start: 2021-08-05 | End: 2021-08-05 | Stop reason: HOSPADM

## 2021-08-05 RX ORDER — POLYETHYLENE GLYCOL 3350 17 G/17G
17 POWDER, FOR SOLUTION ORAL DAILY
Status: DISCONTINUED | OUTPATIENT
Start: 2021-08-06 | End: 2021-08-07 | Stop reason: HOSPADM

## 2021-08-05 RX ORDER — EPHEDRINE SULFATE/0.9% NACL/PF 50 MG/5 ML
SYRINGE (ML) INTRAVENOUS AS NEEDED
Status: DISCONTINUED | OUTPATIENT
Start: 2021-08-05 | End: 2021-08-05 | Stop reason: HOSPADM

## 2021-08-05 RX ORDER — ACETAMINOPHEN 325 MG/1
650 TABLET ORAL EVERY 6 HOURS
Status: DISCONTINUED | OUTPATIENT
Start: 2021-08-05 | End: 2021-08-07 | Stop reason: HOSPADM

## 2021-08-05 RX ORDER — DEXAMETHASONE SODIUM PHOSPHATE 4 MG/ML
INJECTION, SOLUTION INTRA-ARTICULAR; INTRALESIONAL; INTRAMUSCULAR; INTRAVENOUS; SOFT TISSUE AS NEEDED
Status: DISCONTINUED | OUTPATIENT
Start: 2021-08-05 | End: 2021-08-05 | Stop reason: HOSPADM

## 2021-08-05 RX ORDER — PREGABALIN 75 MG/1
75 CAPSULE ORAL ONCE
Status: DISCONTINUED | OUTPATIENT
Start: 2021-08-05 | End: 2021-08-05 | Stop reason: SDUPTHER

## 2021-08-05 RX ORDER — FACIAL-BODY WIPES
10 EACH TOPICAL DAILY PRN
Status: DISCONTINUED | OUTPATIENT
Start: 2021-08-07 | End: 2021-08-07 | Stop reason: HOSPADM

## 2021-08-05 RX ORDER — HYDROMORPHONE HYDROCHLORIDE 1 MG/ML
0.5 INJECTION, SOLUTION INTRAMUSCULAR; INTRAVENOUS; SUBCUTANEOUS
Status: ACTIVE | OUTPATIENT
Start: 2021-08-05 | End: 2021-08-06

## 2021-08-05 RX ORDER — HYDROMORPHONE HYDROCHLORIDE 1 MG/ML
.25-1 INJECTION, SOLUTION INTRAMUSCULAR; INTRAVENOUS; SUBCUTANEOUS
Status: DISCONTINUED | OUTPATIENT
Start: 2021-08-05 | End: 2021-08-05 | Stop reason: HOSPADM

## 2021-08-05 RX ADMIN — OXYCODONE HYDROCHLORIDE 10 MG: 10 TABLET, FILM COATED, EXTENDED RELEASE ORAL at 06:14

## 2021-08-05 RX ADMIN — ACETAMINOPHEN 650 MG: 325 TABLET ORAL at 18:34

## 2021-08-05 RX ADMIN — Medication 80 MCG: at 08:23

## 2021-08-05 RX ADMIN — ACETAMINOPHEN 975 MG: 325 TABLET ORAL at 06:14

## 2021-08-05 RX ADMIN — CELECOXIB 200 MG: 100 CAPSULE ORAL at 18:34

## 2021-08-05 RX ADMIN — SODIUM CHLORIDE, POTASSIUM CHLORIDE, SODIUM LACTATE AND CALCIUM CHLORIDE: 600; 310; 30; 20 INJECTION, SOLUTION INTRAVENOUS at 07:00

## 2021-08-05 RX ADMIN — TRANEXAMIC ACID 1 G: 100 INJECTION, SOLUTION INTRAVENOUS at 07:40

## 2021-08-05 RX ADMIN — CEFAZOLIN SODIUM 2 G: 1 POWDER, FOR SOLUTION INTRAMUSCULAR; INTRAVENOUS at 07:35

## 2021-08-05 RX ADMIN — Medication 10 MG: at 07:46

## 2021-08-05 RX ADMIN — ROPIVACAINE HYDROCHLORIDE 20 ML: 2 INJECTION, SOLUTION EPIDURAL; INFILTRATION at 07:07

## 2021-08-05 RX ADMIN — FAMOTIDINE 20 MG: 20 TABLET ORAL at 18:34

## 2021-08-05 RX ADMIN — ASPIRIN 81 MG: 81 TABLET, COATED ORAL at 13:54

## 2021-08-05 RX ADMIN — DOCUSATE SODIUM 50MG AND SENNOSIDES 8.6MG 1 TABLET: 8.6; 5 TABLET, FILM COATED ORAL at 13:54

## 2021-08-05 RX ADMIN — Medication 10 MG: at 07:44

## 2021-08-05 RX ADMIN — TRANEXAMIC ACID 1 G: 100 INJECTION, SOLUTION INTRAVENOUS at 08:50

## 2021-08-05 RX ADMIN — ACETAMINOPHEN 650 MG: 325 TABLET ORAL at 23:42

## 2021-08-05 RX ADMIN — CEFAZOLIN 2 G: 1 INJECTION, POWDER, FOR SOLUTION INTRAMUSCULAR; INTRAVENOUS at 23:42

## 2021-08-05 RX ADMIN — ASPIRIN 81 MG: 81 TABLET, COATED ORAL at 18:34

## 2021-08-05 RX ADMIN — ACETAMINOPHEN 650 MG: 325 TABLET ORAL at 13:54

## 2021-08-05 RX ADMIN — DEXAMETHASONE SODIUM PHOSPHATE 10 MG: 4 INJECTION, SOLUTION INTRAMUSCULAR; INTRAVENOUS at 08:24

## 2021-08-05 RX ADMIN — MIDAZOLAM 2 MG: 1 INJECTION, SOLUTION INTRAMUSCULAR; INTRAVENOUS at 07:02

## 2021-08-05 RX ADMIN — SODIUM CHLORIDE 125 ML/HR: 9 INJECTION, SOLUTION INTRAVENOUS at 12:01

## 2021-08-05 RX ADMIN — DIPHENHYDRAMINE HYDROCHLORIDE 25 MG: 50 INJECTION, SOLUTION INTRAMUSCULAR; INTRAVENOUS at 08:14

## 2021-08-05 RX ADMIN — Medication 10 ML: at 23:42

## 2021-08-05 RX ADMIN — PREGABALIN 75 MG: 75 CAPSULE ORAL at 06:14

## 2021-08-05 RX ADMIN — Medication 120 MCG: at 07:39

## 2021-08-05 RX ADMIN — BUPIVACAINE HYDROCHLORIDE 2.2 ML: 5 INJECTION, SOLUTION EPIDURAL; INTRACAUDAL; PERINEURAL at 07:19

## 2021-08-05 RX ADMIN — DOCUSATE SODIUM 50MG AND SENNOSIDES 8.6MG 1 TABLET: 8.6; 5 TABLET, FILM COATED ORAL at 18:34

## 2021-08-05 RX ADMIN — CEFAZOLIN 2 G: 1 INJECTION, POWDER, FOR SOLUTION INTRAMUSCULAR; INTRAVENOUS at 13:54

## 2021-08-05 RX ADMIN — PROPOFOL 120 MCG/KG/MIN: 10 INJECTION, EMULSION INTRAVENOUS at 07:35

## 2021-08-05 RX ADMIN — ROPIVACAINE HYDROCHLORIDE 20 ML: 2 INJECTION, SOLUTION EPIDURAL; INFILTRATION at 07:12

## 2021-08-05 RX ADMIN — FENTANYL CITRATE 50 MCG: 50 INJECTION, SOLUTION INTRAMUSCULAR; INTRAVENOUS at 07:02

## 2021-08-05 RX ADMIN — FAMOTIDINE 20 MG: 20 TABLET ORAL at 13:54

## 2021-08-05 RX ADMIN — FENTANYL CITRATE 50 MCG: 50 INJECTION, SOLUTION INTRAMUSCULAR; INTRAVENOUS at 07:12

## 2021-08-05 NOTE — ANESTHESIA PROCEDURE NOTES
Spinal Block    Start time: 8/5/2021 7:13 AM  End time: 8/5/2021 7:19 AM  Performed by: Lawrence Campbell MD  Authorized by: Lawrence Campbell MD     Pre-procedure:   Indications: at surgeon's request and primary anesthetic  Preanesthetic Checklist: patient identified, risks and benefits discussed, anesthesia consent, site marked, patient being monitored and timeout performed    Timeout Time: 07:13 EDT          Spinal Block:   Patient Position:  Seated  Prep Region:  Lumbar  Prep: chlorhexidine      Location:  L3-4  Technique:  Single shot        Needle:   Needle Type:  Pencan  Needle Gauge:  25 G  Attempts:  1      Events: CSF confirmed, no blood with aspiration and no paresthesia        Assessment:  Insertion:  Uncomplicated  Patient tolerance:  Patient tolerated the procedure well with no immediate complications

## 2021-08-05 NOTE — PERIOP NOTES
TRANSFER - OUT REPORT:    Verbal report given to DONOAVN Corrigan(name) on Ron Da Silva  being transferred to [facility](unit) for routine progression of care       Report consisted of patients Situation, Background, Assessment and   Recommendations(SBAR). Information from the following report(s) SBAR, Kardex, Intake/Output, MAR and Cardiac Rhythm RSR was reviewed with the receiving nurse. Lines:   Peripheral IV 08/05/21 Posterior;Right Forearm (Active)   Site Assessment Clean, dry, & intact 08/05/21 0925   Phlebitis Assessment 0 08/05/21 0925   Infiltration Assessment 0 08/05/21 0925   Dressing Status Clean, dry, & intact 08/05/21 0925   Dressing Type Transparent 08/05/21 0925   Hub Color/Line Status Pink;Capped; Patent 08/05/21 0925   Action Taken Open ports on tubing capped 08/05/21 0925   Alcohol Cap Used No 08/05/21 0925        Opportunity for questions and clarification was provided.       Patient transported with:   Registered Nurse

## 2021-08-05 NOTE — DISCHARGE INSTRUCTIONS
TOTAL KNEE DISCHARGE INSTRUCTIONS      Patient: Christi Siemens MRN: [id number]  SSN: [id number]              Please take the time to review the following instructions before you leave the hospital and use them as guidelines during your recovery from surgery. If you have any questions you may contact my office at [phone]  After business hours or during the weekend you can contact me through [address] or text / call at [phone] (cell phone) for emergency's. Please use the office number during regular business hours. SPECIAL INSTRUCTIONS :   1. Full extension at the knee is the most important aspect of your range of motion. Avoid placing a pillow or bump behind the knee. Rather, place the heel up on a bump or pillow and allow gravity to help straighten the knee. 2. You may weight bear as tolerated on the knee and during the day you should bend the knee as much as possible. 3. Drainage from the incision more than 4 days from surgery is concerning. Contact my office if there is any question [phone].   4. You may contact me directly through [employer] if there are specific questions or text / call using my cell number [phone]. DRESSING :     Post-op Dressings : This should be removed by physical therapy or you may remove this yourself 7 days after the date of your surgery. If there is no drainage, then a simple dressing may be used or no dressing at all. Other dressing options can be purchased over the counter at a local pharmacy or medical supply vendor. A porous adhesive dressing such as pictured above can be purchased online ([address] [facility] [address]) or at your local [facility] or [employer]. You only need to keep the incision covered for 7 days after showers. A dressing may be used for longer if there are issues with clothing clinging to the incision. Showering/ Bathing: You may shower with the Post-op dressing in place.  This is left in place for 7 days following discharge from the hospital. If your incision is dry without drainage you may shower following your discharge home. After 7 days your dressing should be removed for showering. It is fine to have water run over the incision. Do not vigorously scrub your incision. Apply a clean, dry dressing after you have dried your incision. Do not take a bath or get into a swimming pool / Lazara Rede until you follow up with Dr. Josh Her. Do not soak your incision under water. If there is continued drainage or you are concerned contact Dr Sosa Milford office prior to showering [phone] ext [phone] . Diet:  You may advance to your regular diet as tolerated. Increase your clear liquid intake for the next 2-3 days. Medication:      1. You will be given prescriptions for pain medication when you are discharged from the hospital. The side effects of these medications can be substantial and the narcotic medications are not mandatory. You may substitute these medications with Tylenol or Alleve / Motrin. 2. Please use the medications as prescribed. Pain medications may cause constipation- Colace twice daily and Miralax one scoop daily while taking the narcotic medication should help prevent constipation. Please discuss with your local pharmacist regarding increasing this dosage if constipation persists. Other possible side effects of pain medication are dizziness, headache, nausea, vomiting, and urinary retention. Discontinue the pain medication if you develop itching, rash, shortness of breath, or difficulties swallowing. If these symptoms become severe or are not relieved by discontinuing the medication, you should seek immediate medical attention. 3. Refills of pain medication are authorized during office hours only (8 AM- 5 PM  Monday thru Friday). Many of these medication will require you or a family member to pick-up a physical prescription at the office.    4. Medications other than antiinflammatories will not be called into the pharmacy after business hours. 5. You may resume the medication(s) you were taking prior to your surgery. Narcotics may change the effects of some antidepressant medication(s). If you have any questions about possible interactions between your regular medications and the pain medication, you should ask the pharmacist or contact the prescribing physician. 6. If you have constipation which is not improved by oral stool softeners then a Ducolax suppository should be purchased over the counter. 7. Continue the blood thinner (Aspirin or Lovenox) for a total of 30 days following surgery. Follow up appointment:    Please call our office at [phone] for your follow up appointment. This should be scheduled 14 days following the date of surgery. You should also have a scheduled appointment for physical therapy following surgery. Physical Therapy / Nursing:    Physical Therapy following surgery will be arranged as an outpatient or at home. They have specific instructions for rehab and wound care. It is fine to have physical therapy remove your dressing at 7 days following surgery. Returning to work:    Normal return to work is 6-12 weeks following surgery. Depending on your progression following surgery and specific job duties you may take longer for a full return to work. DRIVING    You should not return to driving until you are off all opioid pain medications and able to safely and quickly apply the brakes. This is normally 2-6 weeks for left sided surgery and 2-8 weeks for right sided surgery. Important Signs and Symptoms:    If any of the following signs or symptoms occur, you should contact Dr. Elvia Kwon office.   Please be advised if a problem arises which you feel requires immediate medical attention or you are unable to contact Dr. Elvia Kwon office you should seek immediate medical attention at the ER or other health care facility you have access to.    1. A sudden increase in swelling and/or redness or warmth at the area your surgery was performed which isnt relieved by rest, ice, and elevation. 2. Oral temperature greater than 101 degrees for 12 hours or more which isnt relieved by an increase in fluid intake and taking 2 Tylenol every 4-6 hours. 3. Excessive drainage from your incisions, or drainage which hasnt stopped by 72 hours after your surgery. 4. Fever, chills, shortness of breath, chest pain, nausea, vomiting or other signs and symptoms which are of concern to you. YOUR TOTAL JOINT REPLACEMENT  FREQUENTLY ASKED QUESTIONS   What should I take for pain?  o You will be discharged with four medications for pain (Oxycodone, Tramadol, Ibuprofen and Tylenol). These may vary slightly depending on what you were taking in the hospital.   - 1st Line - Tylenol Arthritis Strength - 325-650 mg every 4 hours (scheduled for the 1st 24 hours)  - 2nd Line -  Ibuprofen 400 (2 tabs) - 800 (4 tabs) mg every 8 hours  (scheduled for the 1st 24 hours)  - 3rd Line - Oxycodone 5 mg (1-2 tablets every 4-6 hrs)  - 4th Line - Tramadol 50 mg (1-2 tablets every 4-6 hours) - take these between Oxycodone doses if your pain is not alleviated.  When should I call for advice regarding my pain?  o If your pain is still uncontrolled after being on the regimen above for at least 12 hours, please call the office [phone] or text / call my cell after hours [phone].  Can I get refills?  o Opioid refills are provided for the first 2-6 weeks following surgery. o Use Tylenol 500 mg along with Aleve 220mg twice daily or Motrin 200-800mg every 4-6 hours during the daytime hours after two weeks. - After two weeks, I suggest the opioid pain medications be used only 1 hour prior to your physical therapy appointment and 1 hour before sleeping at night. Use Tylenol and Ibuprofen at other times during the day.    - Keep in mind that you will need to discontinue opioids before you resume driving.  Is swelling normal?  o Almost everyone has some degree of swelling following surgery. o Following hip and knee replacement surgery, swelling can be normal below the incision for the first few weeks. - This swelling peaks around 5-7 days after surgery. - It is not unusual to have some bruising about the back of the thigh, calf, ankle, and foot.  What should I do for the swelling?  o Keep the limb elevated above the level of your heart - 'Toes above Nose'. o Apply compression socks (knee high for total knees and up to the mid-thigh for total hips). o Use the ice packs that you are discharged home with several times a day for the first several weeks.  How long should I remain on blood thinners following surgery? o 30 days   Which blood thinners will I be on? Can I take them with Tylenol?  o  Aspirin 81 mg twice daily - these should be taken with meals and can be used with Tylenol. In certain instances, you may be sent home on Lovenox for 30 days. o For short periods of time (30 days), aspirin and anti-inflammatories (i.e. Aleve, Motrin / Advil / ibuprofen, diclofenac, etc. can be taken together).  When can I drive?  o Once you have stopped using regular narcotic pain medications (Oxycodone, Percocet, Lortab, Norco etc.) and can safely apply the brakes without hesitation, (emergency braking).  When can I shower?  o You may shower immediately if your Optifoam bandage is dry and without discharge. The Optifoam dressing should be removed 7 days following surgery, after which you may continue to shower.  o No submersion of the incision, bathing or swimming for 14 days following surgery or until cleared by Dr Jennie Mitchell.  Can I remove this dressing?  o Yes, this is removed just like removing a band aid.  o If you are concerned, this can be removed by your therapist.   [facility] What do I do with the dressing when I shower?  o The Optifoam dressing is waterproof and you may shower with it. o The incision is sealed with Dermabond, a biologic skin glue, which also serves as a watertight seal. If your incision is draining, it is no longer considered to be watertight - you should contact our office prior to showering if you experience any drainage.  Which dressing should I purchase after I remove my Optifoam?  o An occlusive dressing which covers your entire incision. This does not have to be waterproof, but will need to be removed when you shower and then replaced. (Example Only)   How active should I be following surgery? o Progress activities in moderation and at your own pace.   o Walking room to room in your house is encouraged. o Walk each day and set progressive goals with small increments (1st week - ?block of walking, 2nd week - 1 block, 3rd week - 2 blocks, etc.)   Will I need help at home?  o You will likely need a caretaker who should be available for the first week following surgery. It is fine for family members to work during the day, as long as they are available by phone. o Planning ahead makes coming home from the hospital a much easier transition.  How long will my surgery take?  o On average, total joint replacement takes approximately 1-2 hour.   o The entire process, including pre-op and post-op care can last as long as 4- 5 hours before you are transferred to your room. o stroke, pulmonary embolism (a clot going from the legs to the lungs), and even death with surgery.  Will I be given antibiotics? Will I need antibiotics at discharge?  o Antibiotics will be given to you both before and after your procedure. To further minimize the risk of infection, we have streamlined the surgical procedure to take less time in the operating room.    o You do not require antibiotics following surgery.       Please do not hesitate to contact me through [employer] or by text / call me at [phone] (cell phone) for questions following surgery - MD Gisella Villegas [facility], MD  Cell [phone]  Behzad Jaimes PA-C  Cell [phone]  Medical Assistants: Kavon Palmer [phone]                     DISCHARGE SUMMARY from your Nurse      PATIENT INSTRUCTIONS    After general anesthesia or intravenous sedation, for 24 hours or while taking prescription Narcotics:  · Limit your activities  · Do not drive and operate hazardous machinery  · Do not make important personal or business decisions  · Do  not drink alcoholic beverages  · If you have not urinated within 8 hours after discharge, please contact your surgeon on call. Report the following to your surgeon:  · Excessive pain, swelling, redness or odor of or around the surgical area  · Temperature over 100.5  · Nausea and vomiting lasting longer than 4 hours or if unable to take medications  · Any signs of decreased circulation or nerve impairment to extremity: change in color, persistent  numbness, tingling, coldness or increase pain  · Any questions      GOOD HELP TO FIGHT AN INFECTION  Here are a few tip to help reduce the chance of getting an infection after surgery:   Wash Your Hands   Good handwashing is the most important thing you and your caregiver can do.  Wash before and after caring for any wounds. Dry your hand with a clean towel.  Wash with soap and water for at least 20 seconds. A TIP: sing the \"Happy Birthday\" song through one time while washing to help with the timing.  Use a hand  in between washings.  Shower   When your surgeon says it is OK to take a shower, use a new bar of antibacterial soap (if that is what you use, and keep that bar of soap ONLY for your use), or antibacterial body wash.  Use a clean wash cloth or sponge when you bathe.  Dry off with a clean towel  after every bath - be careful around any wounds, skin staples, sutures or surgical glue over/on wounds.    Do not enter swimming pools, hot tubs, lakes, rivers and/or ocean until wounds are healed and your doctor/surgeon says it is OK.  Use Clean Sheets   Sleep on freshly laundered sheets after your surgery.  Keep the surgery site covered with a clean, dry bandage (if instructed to do so). If the bandage becomes soiled, reapply a new, dry, clean bandage.  Do not allow pets to sleep with you while your wound is healing.  Lifestyle Modification and Controlling Your Blood Sugar   Smoking slows wound healing. Stop smoking and limit exposure to second-hand smoke.  High blood sugar slows wound healing. Eat a well-balanced diet to provide proper nutrition while healing   Monitor your blood sugar (if you are a diabetic) and take your medications as you are suppose to so you can control you blood sugar after surgery. COUGH AND DEEP BREATHE    Breathing deeply and coughing are very important exercises to do after surgery. Deep breathing and coughing open the little air tubes and air sacks in your lungs. You take deep breaths every day. You may not even notice - it is just something you do when you sigh or yawn. It is a natural exercise you do to keep these air passages open. After surgery, take deep breaths and cough, on purpose. DIRECTIONS:  · Take 10 to 15 slow deep breaths every hour while awake. · Breathe in deeply, and hold it for 2 seconds. · Exhale slowly through puckered lips, like blowing up a balloon. · After every 4th or 5th deep breath, hug your pillow to your chest or belly and give a hard, deep cough. Yes, it will probably hurt. But doing this exercise is a very important part of healing after surgery. Take your pain medicine to help you do this exercise without too much pain. Coughing and deep breathing help prevent bronchitis and pneumonia after surgery. If you had chest or belly surgery, use a pillow as a \"hug christel\" and hold it tightly to your chest or belly when you cough.        ANKLE PUMPS    Ankle pumps increase the circulation of oxygenated blood to your lower extremities and decrease your risk for circulation problems such as blood clots. They also stretch the muscles, tendons and ligaments in your foot and ankle, and prevent joint contracture in the ankle and foot, especially after surgeries on the legs. It is important to do ankle pump exercises regularly after surgery because immobility increases your risk for developing a blood clot. Your doctor may also have you take an Aspirin for the next few days as well. If your doctor did not ask you to take an Aspirin, consult with him before starting Aspirin therapy on your own. The exercise is quite simple. · Slowly point your foot forward, feeling the muscles on the top of your lower leg stretch, and hold this position for 5 seconds. · Next, pull your foot back toward you as far as possible, stretching the calf muscles, and hold that position for 5 seconds. · Repeat with the other foot. · Perform 10 repetitions every hour while awake for both ankles if possible (down and then up with the foot once is one repetition). You should feel gentle stretching of the muscles in your lower leg when doing this exercise. If you feel pain, or your range of motion is limited, don't push too hard. Only go the limit your joint and muscles will let you go. If you have increasing pain, progressively worsening leg warmth or swelling, STOP the exercise and call your doctor. MEDICATION AND   SIDE EFFECT GUIDE    The [facility] MEDICATION AND SIDE EFFECT GUIDE was provided to the PATIENT AND CARE PROVIDER. Information provided includes instruction about drug purpose and common side effects for the following medications:   · Zofran  · Oxycodone  · Tramadol        These are general instructions for a healthy lifestyle:    *   Please give a list of your current medications to your Primary Care Provider.   *   Please update this list whenever your medications are discontinued, doses are changed, or new medications (including over-the-counter products) are added. *   Please carry medication information at all times in case of emergency situations. About Smoking  No smoking / No tobacco products  Avoid exposure to second hand smoke     Surgeon General's Warning:  Quitting smoking now greatly reduces serious risk to your health. Obesity, smoking, and sedentary lifestyle greatly increases your risk for illness and disease. A healthy diet, regular physical exercise & weight monitoring are important for maintaining a healthy lifestyle. Congestive Heart Failure  You may be retaining fluid if you have a history of heart failure or if you experience any of the following symptoms:  Weight gain of 3 pounds or more overnight or 5 pounds in a week, increased swelling in your hands or feet or shortness of breath while lying flat in bed. Please call your doctor as soon as you notice any of these symptoms; do not wait until your next office visit. Don't wait more than five minutes to call 911 - MINUTES MATTER! Fast action can save your life. Calling 911 is almost always the fastest way to get lifesaving treatment. Emergency Medical Services staff can begin treatment when they arrive -- up to an hour sooner than if someone gets to the hospital by car. Learning About Coronavirus [phone])  Coronavirus [phone]): Overview  What is coronavirus (COVID-19)? The coronavirus disease (COVID-19) is caused by a virus. It is an illness that was first found in [place], [place], in December 2019. It has since spread worldwide. The virus can cause fever, cough, and trouble breathing. In severe cases, it can cause pneumonia and make it hard to breathe without help. It can cause death. Coronaviruses are a large group of viruses. They cause the common cold.  They also cause more serious illnesses like Middle East respiratory syndrome (MERS) and severe acute respiratory syndrome (SARS). COVID-19 is caused by a novel coronavirus. That means it's a new type that has not been seen in people before. This virus spreads person-to-person through droplets from coughing and sneezing. It can also spread when you are close to someone who is infected. And it can spread when you touch something that has the virus on it, such as a doorknob or a tabletop. What can you do to protect yourself from coronavirus (COVID-19)? The best way to protect yourself from getting sick is to:  · Avoid areas where there is an outbreak. · Avoid contact with people who may be infected. · Wash your hands often with soap or alcohol-based hand sanitizers. · Avoid crowds and try to stay at least 6 feet away from other people. · Wash your hands often, especially after you cough or sneeze. Use soap and water, and scrub for at least 20 seconds. If soap and water aren't available, use an alcohol-based hand . · Avoid touching your mouth, nose, and eyes. What can you do to avoid spreading the virus to others? To help avoid spreading the virus to others:  · Cover your mouth with a tissue when you cough or sneeze. Then throw the tissue in the trash. · Use a disinfectant to clean things that you touch often. · Stay home if you are sick or have been exposed to the virus. Don't go to school, work, or public areas. And don't use public transportation. · If you are sick:  ? Leave your home only if you need to get medical care. But call the doctor's office first so they know you're coming. And wear a face mask, if you have one.  ? If you have a face mask, wear it whenever you're around other people. It can help stop the spread of the virus when you cough or sneeze. ? Clean and disinfect your home every day. Use household  and disinfectant wipes or sprays. Take special care to clean things that you grab with your hands.  These include doorknobs, remote controls, phones, and handles on your refrigerator and microwave. And don't forget countertops, tabletops, bathrooms, and computer keyboards. When to call for help  Call 911 anytime you think you may need emergency care. For example, call if:  · You have severe trouble breathing. (You can't talk at all.)  · You have constant chest pain or pressure. · You are severely dizzy or lightheaded. · You are confused or can't think clearly. · Your face and lips have a blue color. · You pass out (lose consciousness) or are very hard to wake up. Call your doctor now if you develop symptoms such as:  · Shortness of breath. · Fever. · Cough. If you need to get care, call ahead to the doctor's office for instructions before you go. Make sure you wear a face mask, if you have one, to prevent exposing other people to the virus. Where can you get the latest information? The following health organizations are tracking and studying this virus. Their websites contain the most up-to-date information. [employer] also learn what to do if you think you may have been exposed to the virus. · U.S. Centers for Disease Control and Prevention (CDC): The CDC provides updated news about the disease and travel advice. The website also tells you how to prevent the spread of infection. www.cdc.gov  · World Health Organization [facility]): WHO offers information about the virus outbreaks. WHO also has travel advice. www.who.int  Current as of: April 1, 2020               Content Version: 12.4  © 2006-2020 [employer]. Care instructions adapted under license by your healthcare professional. If you have questions about a medical condition or this instruction, always ask your healthcare professional. [address] any warranty or liability for your use of this information. The discharge information has been reviewed with the spouse. Any questions and concerns from the spouse have been addressed. The spouse verbalized understanding.         CONTENTS FOUND IN [place] DISCHARGE ENVELOPE:  [x]     Surgeon and Hospital Discharge Instructions  [x]     [facility] Surgical Services Care Provider Card  [x]     Medication & Side Effect Guide            (your newly prescribed medications have been marked/highlighted showing the most common side effects from   the classes of drugs on your prescriptions)  [x]     Medication Prescription(s) x 3 ( [x] These have been sent electronically to your pharmacy by your surgeon,   - OR -       your surgeon has already provided these to you during a previous/pre-op office visit)  []     [address]  []     Physical Therapy Prescription  []     Follow-up Appointment Cards  []     Surgery-related Pictures/Media  []     Pain block and/or block with On-Q Catheter from Anesthesia Service (information included in your instructions above)  []     Medical device information sheets/pamphlets from their    []     School/work excuse note. []     /parent work excuse note. The following personal items collected during your admission are returned to you:   Dental Appliance: Dental Appliances: None  Vision: Visual Aid: Glasses  Hearing Aid:    Jewelry: Jewelry: None  Clothing: Clothing: Pants, Shirt, Undergarments, Footwear  Other Valuables: Other Valuables: Cell Phone, Money (comment), Money clip (Given to his wife)  Valuables sent to safe:   . .... Clement Fly Clement Fly Anesthesia Discharge Instructions    After general anesthesia or intervenous sedation, for 24 hours or while taking prescription Narcotics:  · Limit your activities  · Do not drive or operate hazardous machinery  · If you have not urinated within 8 hours after discharge, please contact your surgeon or on-call physician.   · Do not make important personal or business decisions  · Do not drink alcoholic beverages      Report the following to your surgeon:  · Excessive pain, swelling, redness or odor of or around the surgical area  · Temperature over 100.5 degrees  · Nausea and vomiting lasting longer than 4 hours or if unable to take medication  · Any signs of decreased circulation or nerve impairment to extremity:  Change in color, persistent numbness, tingling, coldness or increased pain. · Any questions that you might have that are not covered by the discharge instructions        Please remember you have had anesthesia today and will need to take deep breaths and cough to help prevent any lung complications. Please splint any incision you have as shown by nurse and cough and deep breath at least 5 times every 2 hours while you are awake. If given an incentive spirometer please use it every 2 hours while awake. Do 10 times for each use.     If you have sleep apnea and use a CPAP machine    Please use your CPAP when you nap after taking your pain medicine as well as at bedtime    Please do ankle and knee movements as directed and get up and move around as your MD allows to help prevent blood clots    If you have been given an ice bag   Please remember ice bag or cooling appliance should never be placed on bare skin

## 2021-08-05 NOTE — ANESTHESIA POSTPROCEDURE EVALUATION
Procedure(s):  LEFT TOTAL KNEE ARTHROPLASTY MAKOPLASTY. spinal, regional    Anesthesia Post Evaluation      Multimodal analgesia: multimodal analgesia used between 6 hours prior to anesthesia start to PACU discharge  Patient location during evaluation: bedside  Patient participation: complete - patient participated  Level of consciousness: awake  Pain management: adequate  Airway patency: patent  Anesthetic complications: no  Cardiovascular status: acceptable  Respiratory status: acceptable  Hydration status: acceptable        INITIAL Post-op Vital signs:   Vitals Value Taken Time   /68 08/05/21 1045   Temp 36.4 °C (97.6 °F) 08/05/21 1032   Pulse 58 08/05/21 1049   Resp 14 08/05/21 1049   SpO2 92 % 08/05/21 1049   Vitals shown include unvalidated device data.

## 2021-08-05 NOTE — ANESTHESIA PROCEDURE NOTES
Peripheral Block    Start time: 8/5/2021 7:02 AM  End time: 8/5/2021 7:12 AM  Performed by: Linda Santacruz MD  Authorized by: Linda Santacruz MD       Pre-procedure: Indications: at surgeon's request and post-op pain management    Preanesthetic Checklist: patient identified, risks and benefits discussed, site marked, timeout performed, anesthesia consent given and patient being monitored    Timeout Time: 07:02 EDT          Block Type:   Block Type:  Femoral single shot and popliteal  Laterality:  Left  Monitoring:  Continuous pulse ox, frequent vital sign checks, heart rate, responsive to questions and oxygen  Injection Technique:  Single shot  Procedures: ultrasound guided and nerve stimulator    Patient Position: supine  Prep: chlorhexidine    Needle Type:  Stimuplex  Needle Gauge:  22 G  Needle Localization:  Nerve stimulator and ultrasound guidance    Assessment:  Number of attempts:  1  Injection Assessment:  Incremental injection every 5 mL, local visualized surrounding nerve on ultrasound, negative aspiration for blood, no paresthesia and no intravascular symptoms  Patient tolerance:  Patient tolerated the procedure well with no immediate complications  Popliteal/Sciatic block performed with nerve stimulation and landmark identification. Needle used was 4\" stimuplex 21g. 20cc 0.2% ropivacaine injected intermittently with negative aspiration.

## 2021-08-05 NOTE — ANESTHESIA PREPROCEDURE EVALUATION
Relevant Problems   No relevant active problems       Anesthetic History   No history of anesthetic complications            Review of Systems / Medical History  Patient summary reviewed and pertinent labs reviewed    Pulmonary  Within defined limits                 Neuro/Psych   Within defined limits           Cardiovascular              Hyperlipidemia    Exercise tolerance: >4 METS  Comments: H/o syncopal episodes now with loop recorder-cleared, no arrhythmias.    EF 60%   GI/Hepatic/Renal  Within defined limits              Endo/Other      Hypothyroidism: well controlled  Arthritis     Other Findings              Physical Exam    Airway  Mallampati: II  TM Distance: 4 - 6 cm  Neck ROM: decreased range of motion   Mouth opening: Normal     Cardiovascular    Rhythm: regular  Rate: normal         Dental    Dentition: Upper dentition intact and Lower dentition intact     Pulmonary  Breath sounds clear to auscultation               Abdominal         Other Findings            Anesthetic Plan    ASA: 2  Anesthesia type: spinal and regional - femoral single shot and popliteal fossa block      Post-op pain plan if not by surgeon: peripheral nerve block single    Induction: Intravenous  Anesthetic plan and risks discussed with: Patient

## 2021-08-05 NOTE — OP NOTES
OPERATIVE REPORT     Admit Date: 8/5/2021  Admit Diagnosis: Primary osteoarthritis of left knee [M17.12]    Date of Procedure: 8/5/2021   Preoperative Diagnosis: Primary osteoarthritis of left knee [M17.12]  Postoperative Diagnosis: * No post-op diagnosis entered *    Procedure: Procedure(s):  LEFT TOTAL KNEE ARTHROPLASTY MAKOPLASTY  Surgeon: Kamari Mitchell MD  Assistant(s): Kasey Ball PA-C  Anesthesia: Regional   Estimated Blood Loss: 300cc  Specimens: * No specimens in log *   Complications: None       INDICATIONS:   The patient is a 67 y.o., male who has complained of a long history of knee pain. The patient  has failed conservative treatment and presents for definitive operative care. Informed consent obtained including a discussion of the risks and benefits, which include, but are not limited to, bleeding, infection, neurovascular damage, wound complications, pain and stiffness in the knee, periprosthetic loosening, fracture dislocation and DVT, the patient consented for the procedure. DESCRIPTION OF PROCEDURE:        The patient was seen in the preoperative holding area. The patient was positively identified. The limb was initialed,  questions were answered. The patient was given Ancef preop for an antibiotic. The patient was subsequently taken to the operating room. The patient underwent spinal anesthesia. The patient was positioned in the supine position. All bony prominences were well padded. The limb was prepped and draped in a sterile fashion. The appropriate pause for safety was performed. A mid-line incision was created. Utilizing an incision from above the superior pole of the patella distally to the tibial tubercle. The incision was taken down through the skin and subcutaneous tissue until the retinaculum could be identified. This was sharply incised utilizing a medial parapatellar incision. The femoral array was placed at the superior portion of the patella.  The patellar was everted, a planar resurfacing was performed and the drill guide was placed with the appropriate rotation. The medial-based soft tissue was then retracted as well as well as the lateral tissue. Radha pins were placed within the incision for thetibial array (one hand breadth proximal to the superior pole of the patella). The femoral and tibial trackers were placed. The hip center or rotation was established. Registration was performed on the femur and tibia. Osteophytes were removed. The knee was balanced in both flexion and extension with force applied. The computer model of implants and position was verified. Once this was complete the MAKOplasty robot was positioned. Standard cuts were made for the tibia first. The tibial cut was removed. The remaining femoral cuts were made with the robot. The blade was changed and the medial meniscus was removed. The tibial preparation was completed. Including removal of residual medial and lateral mensci. Tibial baseplate placement and keel preparation were performed along with drill holes for the tibial baseplate. Final bony osteophytes were removed and the meniscal rim was removed. The knee was injected with 60cc of Morphine / Ketoralac and Lidocaine. Trial implants were placed and range of motion along with overall fit was established with very good integrity of the MCL noted. The radha pins and trackers were removed and accounted for prior to closure. All the bony surfaces were irrigated. The tibia was placed first, then the poly, residual osteophytes were removed and then the femur. Finally, the patella was placed and press-fit with the clamp / impaction. There was normal tracking of the patella at the conclusion of the case. The knee was very stable after it was taken through a full range of motion. The wound was closed with 0 Vicryl and then number 2 Quill proximally.  Once this had been completed, the skin was closed with 2-0 Vicryl 4-0 monocryl suture and Dermabond. A sterile dressing was applied. The patient was taken from the operating room in stable condition. OPERATIVE FINDINGS : Severe varus OA noted. IMPLANTS :   Implant Name Type Inv. Item Serial No.  Lot No. LRB No. Used Action   COMPONENT PAT [id number] SUPERIOR/INFERIOR KNEE - SN/A  COMPONENT PAT [id number] SUPERIOR/INFERIOR KNEE N/A [employer] [id number] Left 1 Implanted   COMPONENT FEM SZ 5 L KNEE JULIA APATITE CRUCE RET CEMENTLESS - SN/A  COMPONENT FEM SZ 5 L KNEE JULIA APATITE CRUCE RET CEMENTLESS N/A [employer] [id number] Left 1 Implanted   BASEPLATE TIB SZ 5 [id number] KNEE TRITANIUM 4 CRUCFRM - SN/A  BASEPLATE TIB SZ 5 [id number] ML74MM KNEE TRITANIUM 4 CRUCFRM N/A [employer] [id number] Left 1 Implanted   INSERT TIB SZ 5 [id number] KNEE X3 CRUCE RET BEAR TECHNOLOGY - SN/A  INSERT TIB SZ 5 [id number] KNEE X3 CRUCE RET BEAR TECHNOLOGY N/A [employer] [id number] Left 1 Implanted       POST OPERATIVE CONSIDERATIONS :  WBAT    JUSTIFICATION FOR SURGICAL ASSISTANT:   Surgical Assistant, was requried and necessary in this case, to help with soft tissue retraction, extremity positioning, equiment management, implant management, and wound closure.     Willie Goldberg, MD

## 2021-08-05 NOTE — PROGRESS NOTES
Problem: Mobility Impaired (Adult and Pediatric)  Goal: *Acute Goals and Plan of Care (Insert Text)  Description: FUNCTIONAL STATUS PRIOR TO ADMISSION: Patient was independent and active without use of DME.    HOME SUPPORT PRIOR TO ADMISSION: The patient lived with wife but did not require assist.    Physical Therapy Goals  Initiated 8/5/2021    1. Patient will move from supine to sit and sit to supine  in bed with independence within 4 days. 2. Patient will perform sit to stand with modified independence within 4 days. 3. Patient will ambulate with modified independence for 100 feet with the least restrictive device within 4 days. 4. Patient will ascend/descend 4 stairs with 1 handrail(s) with minimal assistance/contact guard assist within 4 days. 5. Patient will perform home exercise program per protocol with independence within 4 days. 6. Patient will demonstrate AROM 0-90 degrees in operative joint within 4 days. Outcome: Progressing Towards Goal   PHYSICAL THERAPY EVALUATION  Patient: James Gipson (60 y.o. male)  Date: 8/5/2021  Primary Diagnosis: Primary osteoarthritis of left knee [M17.12]  Procedure(s) (LRB):  LEFT TOTAL KNEE ARTHROPLASTY MAKOPLASTY (Left) Day of Surgery   Precautions:   Fall, WBAT (lmit flexion to 90 degrees on operative knee)    ASSESSMENT  Based on the objective data described below, the patient presents with decreased ROM and strength to LLE, decreased bed mobility, transfers and functional ambulation following admission for left TKA, makoplasty. Patient has all DME and will need OP PT upon discharge home. Current Level of Function Impacting Discharge (mobility/balance): Educated patient regarding bed exercises and limit of knee flexion to 90 degrees on operative knee. He is currently lacking end range dorsiflexion and cannot do SLR on operative leg. Patient was able to stand and take a few steps with rolling walker +2 min assist.  Vitals stable.   Patient a bit shaky and admits to feeling nervous. Functional Outcome Measure: The patient scored 45/100 on the Barthel outcome measure. Other factors to consider for discharge: none     Patient will benefit from skilled therapy intervention to address the above noted impairments. PLAN :  Recommendations and Planned Interventions: bed mobility training, transfer training, gait training, and therapeutic exercises      Frequency/Duration: Patient will be followed by physical therapy:  twice daily to address goals. Recommendation for discharge: (in order for the patient to meet his/her long term goals)  Outpatient physical therapy follow up recommended for TKA    This discharge recommendation:  Has not yet been discussed the attending provider and/or case management    IF patient discharges home will need the following DME: patient owns DME required for discharge         SUBJECTIVE:   Patient stated I can't really feel this leg yet.     OBJECTIVE DATA SUMMARY:   HISTORY:    Past Medical History:   Diagnosis Date    COVID-19 vaccine series completed 2020    High cholesterol     Hypothyroidism      Past Surgical History:   Procedure Laterality Date    HX COLONOSCOPY      HX VASECTOMY         Personal factors and/or comorbidities impacting plan of care: none    Home Situation  Rails to Enter: Yes  Hand Rails : Bilateral  Current DME Used/Available at Home: Walker, rolling, Cane, straight, Safety frame [employer], Shower chair  Tub or Shower Type: Shower    EXAMINATION/PRESENTATION/DECISION MAKING:   Critical Behavior:  Neurologic State: Alert  Orientation Level: Oriented X4  Cognition: Appropriate decision making, Appropriate for age attention/concentration, Appropriate safety awareness, Follows commands  Safety/Judgement: Good awareness of safety precautions, Insight into deficits         Range Of Motion:  AROM: Within functional limits (lacking end range dorsiflexion on operative leg)        LLE AROM  L Knee Flexion: 85  L Knee Extension: 10              Strength:    Strength: Within functional limits (Not yet able to SLR on operative leg)                    Tone & Sensation:   Tone: Normal              Sensation: Intact (still numb to LLE)                  Functional Mobility:  Bed Mobility:     Supine to Sit: Additional time;Minimum assistance  Sit to Supine: Minimum assistance;Assist x2  Scooting: Contact guard assistance  Transfers:  Sit to Stand: Assist x2;Minimum assistance  Stand to Sit: Assist x2;Minimum assistance                       Balance:   Sitting: Intact  Standing: Impaired  Standing - Static: Constant support  Standing - Dynamic : Fair  Ambulation/Gait Training:  Distance (ft): 6 Feet (ft)  Assistive Device: Gait belt;Walker, rolling  Ambulation - Level of Assistance: Assist x2;Minimal assistance        Gait Abnormalities: Step to gait; Decreased step clearance     Left Side Weight Bearing: As tolerated                                  Therapeutic Exercises: Ankle pumps, quad and heel sets, heel slides    Functional Measure:  Barthel Index:    Bathin  Bladder: 10  Bowels: 10  Groomin  Dressin  Feeding: 10  Mobility: 0  Stairs: 0  Toilet Use: 0  Transfer (Bed to Chair and Back): 5  Total: 45/100       The Barthel ADL Index: Guidelines  1. The index should be used as a record of what a patient does, not as a record of what a patient could do. 2. The main aim is to establish degree of independence from any help, physical or verbal, however minor and for whatever reason. 3. The need for supervision renders the patient not independent. 4. A patient's performance should be established using the best available evidence. Asking the patient, friends/relatives and nurses are the usual sources, but direct observation and common sense are also important. However direct testing is not needed.   5. Usually the patient's performance over the preceding 24-48 hours is important, but occasionally longer periods will be relevant. 6. Middle categories imply that the patient supplies over 50 per cent of the effort. 7. Use of aids to be independent is allowed. [place]., Barthel, D.W. ([id number]). Functional evaluation: the Barthel Index. [address] (14)2. JayneSAULO Dominguez, Garret Frazier., Latasha Botello., Jose, [address] (1999). Measuring the change indisability after inpatient rehabilitation; comparison of the responsiveness of the Barthel Index and Functional [place] Measure. Journal of Neurology, Neurosurgery, and Psychiatry, 66(4), 536-685. CRIS Veliz.A, DENISE Zapien, & Lisa Vee M.A. (2004.) Assessment of post-stroke quality of life in cost-effectiveness studies: The usefulness of the Barthel Index and the EuroQoL-5D. Quality of Life Research, 15, 443-99           Physical Therapy Evaluation Charge Determination   History Examination Presentation Decision-Making   LOW Complexity : Zero comorbidities / personal factors that will impact the outcome / POC LOW Complexity : 1-2 Standardized tests and measures addressing body structure, function, activity limitation and / or participation in recreation  LOW Complexity : Stable, uncomplicated  Other outcome measures Barthel  LOW       Based on the above components, the patient evaluation is determined to be of the following complexity level: LOW     Pain Rating:  None at this time    Activity Tolerance:   Good    After treatment patient left in no apparent distress:   Supine in bed, Call bell within reach, Bed / chair alarm activated, and Side rails x 3    COMMUNICATION/EDUCATION:   The patients plan of care was discussed with: Registered nurse. Fall prevention education was provided and the patient/caregiver indicated understanding. and Patient/family agree to work toward stated goals and plan of care.     Thank you for this referral.  Isabel Prieto, PT   Time Calculation: 30 mins

## 2021-08-06 PROCEDURE — 99218 HC RM OBSERVATION: CPT

## 2021-08-06 PROCEDURE — 97530 THERAPEUTIC ACTIVITIES: CPT

## 2021-08-06 PROCEDURE — 97116 GAIT TRAINING THERAPY: CPT

## 2021-08-06 PROCEDURE — 2709999900 HC NON-CHARGEABLE SUPPLY

## 2021-08-06 PROCEDURE — 74011000250 HC RX REV CODE- 250: Performed by: PHYSICIAN ASSISTANT

## 2021-08-06 PROCEDURE — 97535 SELF CARE MNGMENT TRAINING: CPT | Performed by: OCCUPATIONAL THERAPIST

## 2021-08-06 PROCEDURE — 74011250636 HC RX REV CODE- 250/636: Performed by: PHYSICIAN ASSISTANT

## 2021-08-06 PROCEDURE — 74011250637 HC RX REV CODE- 250/637: Performed by: PHYSICIAN ASSISTANT

## 2021-08-06 RX ADMIN — ACETAMINOPHEN 650 MG: 325 TABLET ORAL at 17:38

## 2021-08-06 RX ADMIN — DOCUSATE SODIUM 50MG AND SENNOSIDES 8.6MG 1 TABLET: 8.6; 5 TABLET, FILM COATED ORAL at 17:38

## 2021-08-06 RX ADMIN — Medication 10 ML: at 05:54

## 2021-08-06 RX ADMIN — FAMOTIDINE 20 MG: 20 TABLET ORAL at 17:38

## 2021-08-06 RX ADMIN — CELECOXIB 200 MG: 100 CAPSULE ORAL at 08:58

## 2021-08-06 RX ADMIN — FAMOTIDINE 20 MG: 20 TABLET ORAL at 08:58

## 2021-08-06 RX ADMIN — THERA TABS 1 TABLET: TAB at 08:58

## 2021-08-06 RX ADMIN — ASPIRIN 81 MG: 81 TABLET, COATED ORAL at 08:58

## 2021-08-06 RX ADMIN — OXYCODONE 10 MG: 5 TABLET ORAL at 22:20

## 2021-08-06 RX ADMIN — ASPIRIN 81 MG: 81 TABLET, COATED ORAL at 17:38

## 2021-08-06 RX ADMIN — Medication 10 ML: at 22:21

## 2021-08-06 RX ADMIN — ACETAMINOPHEN 650 MG: 325 TABLET ORAL at 05:53

## 2021-08-06 RX ADMIN — POLYETHYLENE GLYCOL 3350 17 G: 17 POWDER, FOR SOLUTION ORAL at 08:58

## 2021-08-06 RX ADMIN — SODIUM CHLORIDE 125 ML/HR: 9 INJECTION, SOLUTION INTRAVENOUS at 05:55

## 2021-08-06 RX ADMIN — LEVOTHYROXINE SODIUM 25 MCG: 0.03 TABLET ORAL at 05:54

## 2021-08-06 RX ADMIN — DOCUSATE SODIUM 50MG AND SENNOSIDES 8.6MG 1 TABLET: 8.6; 5 TABLET, FILM COATED ORAL at 08:58

## 2021-08-06 RX ADMIN — Medication 200 MG: at 08:58

## 2021-08-06 RX ADMIN — CEFAZOLIN 2 G: 1 INJECTION, POWDER, FOR SOLUTION INTRAMUSCULAR; INTRAVENOUS at 05:53

## 2021-08-06 RX ADMIN — CELECOXIB 200 MG: 100 CAPSULE ORAL at 17:38

## 2021-08-06 RX ADMIN — Medication 10 ML: at 17:38

## 2021-08-06 RX ADMIN — ROSUVASTATIN CALCIUM 10 MG: 10 TABLET, COATED ORAL at 09:44

## 2021-08-06 NOTE — FACE TO FACE
Rounded on patient, f/u from Joint Pre-op Patient Education Class. Patient states class information was valuable in preparing for surgery. Patient states their home space is prepared and safe for recovery. Reviewed ice application, exercises and incentive spirometry use. Patient states needs have been met, pain is controlled. Questions answered.

## 2021-08-06 NOTE — PROGRESS NOTES
Report received care assumed pt russ emptied voiced no needs at this time       2220  Pt given med without difficulty     0001  Pt given med without difficulty     0150  Pt resting quietly no distress noted call bell within reach     0426  Rounds made no distress noted call bell within reach    0645  Med given without difficulty call bell within reach     0730  Bedside and Verbal shift change report given to Manjinder Chavez (oncoming nurse) by Shanita Burr (offgoing nurse). Report included the following information SBAR, Kardex, ED Summary, OR Summary, Procedure Summary, Intake/Output, MAR and Recent Results.

## 2021-08-06 NOTE — PROGRESS NOTES
Problem: Mobility Impaired (Adult and Pediatric)  Goal: *Acute Goals and Plan of Care (Insert Text)  Description: FUNCTIONAL STATUS PRIOR TO ADMISSION: Patient was independent and active without use of DME.    HOME SUPPORT PRIOR TO ADMISSION: The patient lived with wife but did not require assist.    Physical Therapy Goals  Initiated 8/5/2021    1. Patient will move from supine to sit and sit to supine  in bed with independence within 4 days. 2. Patient will perform sit to stand with modified independence within 4 days. 3. Patient will ambulate with modified independence for 100 feet with the least restrictive device within 4 days. 4. Patient will ascend/descend 4 stairs with 1 handrail(s) with minimal assistance/contact guard assist within 4 days. 5. Patient will perform home exercise program per protocol with independence within 4 days. 6. Patient will demonstrate AROM 0-90 degrees in operative joint within 4 days. 8/6/2021 1610 by Dolly Laurent  Note:   PHYSICAL THERAPY TREATMENT  Patient: Aliya Duong (51 y.o. male)  Date: 8/6/2021  Diagnosis: Primary osteoarthritis of left knee [M17.12] <principal problem not specified>  Procedure(s) (LRB):  LEFT TOTAL KNEE ARTHROPLASTY MAKOPLASTY (Left) 1 Day Post-Op  Precautions: Fall, WBAT (lmit flexion to 90 degrees on operative knee)  Chart, physical therapy assessment, plan of care and goals were reviewed. ASSESSMENT  Patient continues with skilled PT services and is progressing towards goals. Pt not discharge today 2/2 urinary retention. Reveiwed HEP , demonstrates good quad control with SLR, reviewed heel slide limiting flexion to 90 degrees. Progressing toward heel/toe gait pattern usig nRW for steadying. Pt is cleared for discharge from PT standpoint.       Current Level of Function Impacting Discharge (mobility/balance): CGA/SBA    Other factors to consider for discharge:          PLAN :  Patient continues to benefit from skilled intervention to address the above impairments. Continue treatment per established plan of care. to address goals. Recommendation for discharge: (in order for the patient to meet his/her long term goals)  Outpatient physical therapy follow up recommended for TKA    This discharge recommendation:  Has been made in collaboration with the attending provider and/or case management    IF patient discharges home will need the following DME: RW has been delivered for discharge       SUBJECTIVE:   Patient stated i couldn't pee.     OBJECTIVE DATA SUMMARY:   Critical Behavior:  Neurologic State: Alert, Appropriate for age  Orientation Level: Oriented X4  Cognition: Appropriate decision making, Appropriate for age attention/concentration, Appropriate safety awareness, Follows commands  Safety/Judgement: Awareness of environment, Driving appropriateness, Fall prevention, Insight into deficits, Home safety, Good awareness of safety precautions    Range of Motion:  AROM: Within functional limits                 LLE AROM  L Knee Flexion: 85  L Knee Extension: 5       Functional Mobility Training:  Bed Mobility:  Rolling: Supervision  Supine to Sit: Supervision  Sit to Supine: Contact guard assistance  Scooting: Supervision        Transfers:  Sit to Stand: Stand-by assistance  Stand to Sit: Stand-by assistance        Bed to Chair: Contact guard assistance; Additional time                    Balance:  Sitting: Intact  Standing: Intact; With support  Standing - Static: Constant support;Good  Standing - Dynamic : Good;Constant support  Ambulation/Gait Training:  Distance (ft): 120 Feet (ft)  Assistive Device: Walker, rolling;Gait belt  Ambulation - Level of Assistance: Contact guard assistance;Stand-by assistance        Gait Abnormalities: Decreased step clearance; Step to gait                                      Stairs:  Number of Stairs Trained: 8  Stairs - Level of Assistance: Contact guard assistance   Rail Use: Left  (lateral technique)    Therapeutic Exercises:     EXERCISE   Sets   Reps   Active Active Assist   Passive Self ROM   Comments   Ankle Pumps   [x]                                        []                                        []                                        []                                           Quad Sets   [x]                                        []                                        []                                        []                                           Hamstring Sets   []                                        []                                        []                                        []                                           Short Arc Quads   [x]                                        []                                        []                                        []                                           Knee Extension Stretch     []                                          []                                          [x]                                          []                                           Heel Slides   [x]                                        []                                        []                                        []                                           Long Arc Quads   [x]                                        []                                        []                                        []                                           Knee Flexion Stretch   []                                        []                                        []                                        []                                           Straight Leg Raises   [x]                                        []                                        []                                        []                                               Pain Rating:  3/10    Activity Tolerance:   Good    After treatment patient left in no apparent distress: Supine in bed, Call bell within reach, Bed / chair alarm activated, and Caregiver / family present    COMMUNICATION/COLLABORATION:   The patients plan of care was discussed with: Registered nurseJesus Ramos   Time Calculation: 17 mins          8/6/2021 1105 by Deidre Beth  Outcome: Progressing Towards Goal  Note:   PHYSICAL THERAPY TREATMENT  Patient: Selena Sanders (44 y.o. male)  Date: 8/6/2021  Diagnosis: Primary osteoarthritis of left knee [M17.12] <principal problem not specified>  Procedure(s) (LRB):  LEFT TOTAL KNEE ARTHROPLASTY MAKOPLASTY (Left) 1 Day Post-Op  Precautions: Fall, WBAT (lmit flexion to 90 degrees on operative knee)  Chart, physical therapy assessment, plan of care and goals were reviewed. ASSESSMENT  Patient continues with skilled PT services and is progressing towards goals. Pt eager to participate with therapy. CGA for functional mobility tasks using RW for steadying not knee instability noted. Ascend/descend 8 steps using single handrail on L using lateral technique. Pt verbalized understanding to use RW for all gait and transfers. Reviewed HEP and proper car transfers. Pt is cleared for discharge from PT standpoint. Current Level of Function Impacting Discharge (mobility/balance): CGA    Other factors to consider for discharge:          PLAN :  Patient continues to benefit from skilled intervention to address the above impairments. Continue treatment per established plan of care. to address goals. Recommendation for discharge: (in order for the patient to meet his/her long term goals)  Outpatient physical therapy follow up recommended for TKA    This discharge recommendation:  Has been made in collaboration with the attending provider and/or case management    IF patient discharges home will need the following DME: patient owns DME required for discharge       SUBJECTIVE:   Patient stated I am doing pretty good.     OBJECTIVE DATA SUMMARY:   Critical Behavior:  Neurologic State: Alert, Appropriate for age  Orientation Level: Oriented X4  Cognition: Appropriate decision making, Appropriate for age attention/concentration, Appropriate safety awareness, Follows commands  Safety/Judgement: Awareness of environment, Driving appropriateness, Fall prevention, Insight into deficits, Home safety, Good awareness of safety precautions    Range of Motion:  AROM: Within functional limits                         Functional Mobility Training:  Bed Mobility:  Rolling: Supervision  Supine to Sit: Supervision  Sit to Supine: Supervision  Scooting: Supervision        Transfers:  Sit to Stand: Contact guard assistance; Additional time;Assist x1  Stand to Sit: Contact guard assistance        Bed to Chair: Contact guard assistance; Additional time                    Balance:  Sitting: Intact  Standing: Intact; With support  Standing - Static: Constant support;Good  Standing - Dynamic : Fair  Ambulation/Gait Training:  Distance (ft): 100 Feet (ft)  Assistive Device: Walker, rolling;Gait belt  Ambulation - Level of Assistance: Contact guard assistance; Additional time;Assist x1        Gait Abnormalities: Decreased step clearance; Step to gait                                      Stairs:  Number of Stairs Trained: 8  Stairs - Level of Assistance: Contact guard assistance   Rail Use: Left  (lateral technique)    Therapeutic Exercises:     EXERCISE   Sets   Reps   Active Active Assist   Passive Self ROM   Comments   Ankle Pumps   [x]                                        []                                        []                                        []                                           Quad Sets   [x]                                        []                                        []                                        []                                           Hamstring Sets   []                                        []                                        [] []                                           Short Arc Quads   [x]                                        []                                        []                                        []                                           Knee Extension Stretch     []                                          []                                          [x]                                          []                                           Heel Slides   [x]                                        []                                        []                                        []                                           Long Arc Quads   []                                        []                                        []                                        []                                           Knee Flexion Stretch   []                                        []                                        []                                        []                                           Straight Leg Raises   [x]                                        []                                        []                                        []                                               Pain Ratin/10    Activity Tolerance:   Good    After treatment patient left in no apparent distress:   Sitting in chair, Call bell within reach, Bed / chair alarm activated, and Caregiver / family present    COMMUNICATION/COLLABORATION:   The patients plan of care was discussed with: Registered nurse.      Omar Izquierdo   Time Calculation: 40 mins

## 2021-08-06 NOTE — PROGRESS NOTES
TOTAL KNEE ARTHROPLASTY DAILY NOTE     ASSESSMENT / PLAN :   Pain Control : Excellent - Able to sleep and participate with therapy  Overnight Issues : none  Wound or incisional issue : Healing incision with no visible drainage  Therapy / Weight Bearing Recommendations : Weight bear as tolerated with use of a walker and two person assist while mobilizing  DVT Prophylaxis : Mechanical and Aspirin and mechanical lower extremity compression device  Disposition : Discharge to home with home health (maximum of 4 visits)  Medical Concerns : none  Comments : none     POD  1 Day Post-Op s/p Procedure(s):  LEFT TOTAL KNEE ARTHROPLASTY MAKOPLASTY     SUBJECTIVE :     Overnight complaints : none. OBJECTIVE :     Vitals:    08/05/21 2000 08/06/21 0426 08/06/21 0844 08/06/21 1215   BP: (!) 141/76 122/66 128/70 120/80   Pulse: 80 76 72 66   Resp:   16    Temp: 98.5 °F (36.9 °C) 97.9 °F (36.6 °C) 97.7 °F (36.5 °C) 98 °F (36.7 °C)   SpO2: 96% 96% 93% 96%   Weight:       Height:           Alert and oriented x3. Examination of the left knee reveals that the dressing is clean, dry and intact. Motor Exam : Pt is able to perform a straight leg raise. Sensation is intact to light touch. No calf pain. MEDS / LABS :     Key Anti-Platelet Anticoagulant Meds               aspirin delayed-release 81 mg tablet (Taking) Take 1 Tablet by mouth two (2) times a day. aspirin delayed-release 81 mg tablet Take 81 mg by mouth daily. Labs:  No results for input(s): HGB, HCT, INR, NA, K, CL, CO2, BUN, CREA, GLU, HGBEXT, HCTEXT, INREXT in the last 72 hours.    Patient mobility  Gait  Gait Abnormalities: Decreased step clearance, Step to gait  Ambulation - Level of Assistance: Contact guard assistance, Additional time, Assist x1  Distance (ft): 100 Feet (ft)  Assistive Device: Walker, rolling, Gait belt  Rail Use: Left  (lateral technique)  Stairs - Level of Assistance: Contact guard assistance  Number of Stairs Trained: Audra Diggs MD  Cell [phone]  Magen Christie  Cell [phone]  Medical Assistants: Ja Hurt [phone]                 Surgery Scheduler: ELENO Hernandez [phone]

## 2021-08-06 NOTE — PROGRESS NOTES
Problem: Mobility Impaired (Adult and Pediatric)  Goal: *Acute Goals and Plan of Care (Insert Text)  Description: FUNCTIONAL STATUS PRIOR TO ADMISSION: Patient was independent and active without use of DME.    HOME SUPPORT PRIOR TO ADMISSION: The patient lived with wife but did not require assist.    Physical Therapy Goals  Initiated 8/5/2021    1. Patient will move from supine to sit and sit to supine  in bed with independence within 4 days. 2. Patient will perform sit to stand with modified independence within 4 days. 3. Patient will ambulate with modified independence for 100 feet with the least restrictive device within 4 days. 4. Patient will ascend/descend 4 stairs with 1 handrail(s) with minimal assistance/contact guard assist within 4 days. 5. Patient will perform home exercise program per protocol with independence within 4 days. 6. Patient will demonstrate AROM 0-90 degrees in operative joint within 4 days. Outcome: Progressing Towards Goal  Note:   PHYSICAL THERAPY TREATMENT  Patient: Holly Farah (77 y.o. male)  Date: 8/6/2021  Diagnosis: Primary osteoarthritis of left knee [M17.12] <principal problem not specified>  Procedure(s) (LRB):  LEFT TOTAL KNEE ARTHROPLASTY MAKOPLASTY (Left) 1 Day Post-Op  Precautions: Fall, WBAT (lmit flexion to 90 degrees on operative knee)  Chart, physical therapy assessment, plan of care and goals were reviewed. ASSESSMENT  Patient continues with skilled PT services and is progressing towards goals. Pt eager to participate with therapy. CGA for functional mobility tasks using RW for steadying not knee instability noted. Ascend/descend 8 steps using single handrail on L using lateral technique. Pt verbalized understanding to use RW for all gait and transfers. Reviewed HEP and proper car transfers. Pt is cleared for discharge from PT standpoint.      Current Level of Function Impacting Discharge (mobility/balance): CGA    Other factors to consider for discharge:          PLAN :  Patient continues to benefit from skilled intervention to address the above impairments. Continue treatment per established plan of care. to address goals. Recommendation for discharge: (in order for the patient to meet his/her long term goals)  Outpatient physical therapy follow up recommended for TKA    This discharge recommendation:  Has been made in collaboration with the attending provider and/or case management    IF patient discharges home will need the following DME: patient owns DME required for discharge       SUBJECTIVE:   Patient stated I am doing pretty good.     OBJECTIVE DATA SUMMARY:   Critical Behavior:  Neurologic State: Alert, Appropriate for age  Orientation Level: Oriented X4  Cognition: Appropriate decision making, Appropriate for age attention/concentration, Appropriate safety awareness, Follows commands  Safety/Judgement: Awareness of environment, Driving appropriateness, Fall prevention, Insight into deficits, Home safety, Good awareness of safety precautions    Range of Motion:  AROM: Within functional limits                         Functional Mobility Training:  Bed Mobility:  Rolling: Supervision  Supine to Sit: Supervision  Sit to Supine: Supervision  Scooting: Supervision        Transfers:  Sit to Stand: Contact guard assistance; Additional time;Assist x1  Stand to Sit: Contact guard assistance        Bed to Chair: Contact guard assistance; Additional time                    Balance:  Sitting: Intact  Standing: Intact; With support  Standing - Static: Constant support;Good  Standing - Dynamic : Fair  Ambulation/Gait Training:  Distance (ft): 100 Feet (ft)  Assistive Device: Walker, rolling;Gait belt  Ambulation - Level of Assistance: Contact guard assistance; Additional time;Assist x1        Gait Abnormalities: Decreased step clearance; Step to gait                                      Stairs:  Number of Stairs Trained: 8  Stairs - Level of Assistance: Contact guard assistance   Rail Use: Left  (lateral technique)    Therapeutic Exercises:     EXERCISE   Sets   Reps   Active Active Assist   Passive Self ROM   Comments   Ankle Pumps   [x]                                        []                                        []                                        []                                           Quad Sets   [x]                                        []                                        []                                        []                                           Hamstring Sets   []                                        []                                        []                                        []                                           Short Arc Quads   [x]                                        []                                        []                                        []                                           Knee Extension Stretch     []                                          []                                          [x]                                          []                                           Heel Slides   [x]                                        []                                        []                                        []                                           Long Arc Quads   []                                        []                                        []                                        []                                           Knee Flexion Stretch   []                                        []                                        []                                        []                                           Straight Leg Raises   [x]                                        []                                        []                                        []                                               Pain Ratin/10    Activity Tolerance:   Good    After treatment patient left in no apparent distress:   Sitting in chair, Call bell within reach, Bed / chair alarm activated, and Caregiver / family present    COMMUNICATION/COLLABORATION:   The patients plan of care was discussed with: Registered nurseJesus Ordoñez   Time Calculation: 40 mins

## 2021-08-06 NOTE — PROGRESS NOTES
OCCUPATIONAL THERAPY EVALUATION/DISCHARGE  Patient: Deidre Palomo (55 y.o. male)  Date: 8/6/2021  Primary Diagnosis: Primary osteoarthritis of left knee [M17.12]  Procedure(s) (LRB):  LEFT TOTAL KNEE ARTHROPLASTY MAKOPLASTY (Left) 1 Day Post-Op   Precautions:  Fall, WBAT (lmit flexion to 90 degrees on operative knee)    ASSESSMENT  Based on the objective data described below, the patient presents with increased anxiety, fair safety awareness, some LOB, but able to self correct and at an overall CGA and set-up level with LE ADLs, toileting ans functional mobility amb with RW POD 1 L TKA. Educated pt on home safety, fall prevention and relaxation techniques using PLB. Pt verbalized good understanding. Pt will have good support from family at home. No further skilled OT required at this time. PT will continue to follow. Current Level of Function (ADLs/self-care):CGA and set-up level with LE ADLs, toileting ans functional mobility amb with RW     Functional Outcome Measure: The patient scored 70/100 on the Barthel Index outcome measure. Other factors to consider for discharge: see above     PLAN :  Recommendation for discharge: (in order for the patient to meet his/her long term goals)  No skilled occupational therapy/ follow up rehabilitation needs identified at this time. This discharge recommendation:  Has not yet been discussed the attending provider and/or case management    IF patient discharges home will need the following DME: patient owns DME required for discharge       SUBJECTIVE:   Patient stated I feel shaky and nervous.     OBJECTIVE DATA SUMMARY:   HISTORY:   Past Medical History:   Diagnosis Date    COVID-19 vaccine series completed 2020    High cholesterol     Hypothyroidism      Past Surgical History:   Procedure Laterality Date    HX COLONOSCOPY      HX VASECTOMY         Prior Level of Function/Environment/Context: Independent, active, drives.   Expanded or extensive additional review of patient history:     Home Situation  Home Environment: Private residence  # Steps to Enter: 5  Rails to Enter: Yes  Hand Rails : Left  One/Two Story Residence: Two story, live on 1st floor  Living Alone: No  Support Systems: Spouse/Significant Other/Partner  Patient Expects to be Discharged to[de-identified] House  Current DME Used/Available at Home: Cane, straight, Shower chair, Raised toilet seat, Walker, rolling  Tub or Shower Type: Shower    Hand dominance: Right    EXAMINATION OF PERFORMANCE DEFICITS:  Cognitive/Behavioral Status:  Neurologic State: Alert; Appropriate for age  Orientation Level: Oriented X4  Cognition: Appropriate decision making; Appropriate for age attention/concentration; Appropriate safety awareness; Follows commands  Perception: Appears intact  Perseveration: No perseveration noted  Safety/Judgement: Awareness of environment;Driving appropriateness; Fall prevention; Insight into deficits;Home safety;Good awareness of safety precautions    Hearing: Auditory  Auditory Impairment: None    Vision/Perceptual:    Acuity: Within Defined Limits    Corrective Lenses: Glasses    Range of Motion:  AROM: Within functional limits                         Strength:  Strength: Within functional limits                Coordination:     Fine Motor Skills-Upper: Left Intact; Right Intact    Gross Motor Skills-Upper: Left Intact; Right Intact    Tone & Sensation:  Tone: Normal  Sensation: Intact                      Balance:  Sitting: Intact  Standing: Impaired  Standing - Static: Fair  Standing - Dynamic : Fair    Functional Mobility and Transfers for ADLs:  Bed Mobility:  Rolling: Supervision  Supine to Sit: Supervision  Sit to Supine: Supervision  Scooting: Supervision    Transfers:  Sit to Stand: Contact guard assistance; Additional time  Stand to Sit: Contact guard assistance; Additional time  Bed to Chair: Contact guard assistance; Additional time  Bathroom Mobility: Contact guard assistance  Toilet Transfer : Contact guard assistance  Assistive Device : Gait Belt;Walker, rolling    ADL Assessment:  Feeding: Independent    Oral Facial Hygiene/Grooming: Contact guard assistance (standing at sink)    Bathing: Contact guard assistance; Additional time (seated and bending forward to reach feet)    Upper Body Dressing: Setup    Lower Body Dressing: Contact guard assistance; Additional time    Toileting: Contact guard assistance; Additional time;Assist x1 (1 LOB he was able to self correct)                ADL Intervention and task modifications:  Cognitive Retraining  Safety/Judgement: Awareness of environment;Driving appropriateness; Fall prevention; Insight into deficits;Home safety;Good awareness of safety precautions    Bathing: Patient instructed and indicated understanding when bathing to not submerge wound in water, stand to shower or sponge bathe, cover wound with plastic and tape to ensure no water reaches bandage/wound without cues. Dressing joint: Patient instructed and demonstrated understanding to don/doff Left LE first/last with verbal cues. Patient instructed and demonstrated to don all clothing while sitting prior to standing, doff all clothing to knees while standing, then sit to doff clothing off from knees to feet in order to facilitate fall prevention, pain management, and energy conservation with Contact guard assistance. Dressing joint reach exercise: To increase independence with lower body dressing, patient instructed and demonstrated to reach down Left LE in a seated position slowly to prevent tearing/shearing until slight pull is felt, hold at end range for 10 seconds, then return to starting upright position with Contact guard assistance. Patient instructed to complete three sets of three repetitions each daily.    Home safety: Patient instructed and indicated understanding on home modifications and safety (raise height of ADL objects, appropriate height of chair surfaces, recliner safety, change of floor surfaces, clear pathways) to increase independence and fall prevention. Standing: Patient instructed and demonstrated during ADLs to walk up to sink/counter top/surfaces, step into walker to increase safety of joint and fall prevention with Contact guard assistance. Patient educated about knee anatomy and educated to avoid rotation of Left LE. Instructed to apply concept to ADLs within the home (no twisting of knee during reaching across body, square off while using objects, slide objects along surfaces). Patient instructed and indicated understanding to increase amount of time standing, observe standing position during ADLs in order to increase even weight bearing through bilateral LEs in order to increase independence with ADLs. Goal to be reached 30 days post - op, per orthopedic surgeon or per PT. Tub/shower transfer: Patient instructed and indicated understanding regarding when it is safe to begin transfer into tub/shower (complete stairs with PT, advance exercises with PT high enough to clear tub/shower height). Patient instructed to use the same technique as used with stairs when entering and exiting tub/shower (\"up with the non-surgical, down with the surgical leg\"). Functional Measure:  Barthel Index:    Bathin  Bladder: 10  Bowels: 10  Groomin  Dressin  Feeding: 10  Mobility: 10  Stairs: 5  Toilet Use: 5  Transfer (Bed to Chair and Back): 10  Total: 70/100        The Barthel ADL Index: Guidelines  1. The index should be used as a record of what a patient does, not as a record of what a patient could do. 2. The main aim is to establish degree of independence from any help, physical or verbal, however minor and for whatever reason. 3. The need for supervision renders the patient not independent. 4. A patient's performance should be established using the best available evidence.  Asking the patient, friends/relatives and nurses are the usual sources, but direct observation and common sense are also important. However direct testing is not needed. 5. Usually the patient's performance over the preceding 24-48 hours is important, but occasionally longer periods will be relevant. 6. Middle categories imply that the patient supplies over 50 per cent of the effort. 7. Use of aids to be independent is allowed. Jyoti Braden., Barthel, D.W. (1889). Functional evaluation: the Barthel Index. [address] (14)2. Anna Jason elliott SAULO Pagan, Kaleigh Peralta., Cathaleen Pel., Jose, [facility] (). Measuring the change indisability after inpatient rehabilitation; comparison of the responsiveness of the Barthel Index and Functional Swift Measure. Journal of Neurology, Neurosurgery, and Psychiatry, 66(4), 989-780. ROSALIND Tipton, DENISE Zapien, & Arthur Gaines MJesusA. (2004.) Assessment of post-stroke quality of life in cost-effectiveness studies: The usefulness of the Barthel Index and the EuroQoL-5D. Quality of Life Research, 15, 311-72       Occupational Therapy Evaluation Charge Determination   History Examination Decision-Making   LOW Complexity : Brief history review  LOW Complexity : 1-3 performance deficits relating to physical, cognitive , or psychosocial skils that result in activity limitations and / or participation restrictions  LOW Complexity : No comorbidities that affect functional and no verbal or physical assistance needed to complete eval tasks       Based on the above components, the patient evaluation is determined to be of the following complexity level: LOW   Pain Ratin/10    Activity Tolerance:   Good    After treatment patient left in no apparent distress:    Sitting in chair, Call bell within reach and Bed / chair alarm activated    COMMUNICATION/EDUCATION:   The patients plan of care was discussed with: Physical therapy assistant and Registered nurse.      Thank you for this referral.  Kath Philippe, EMELI  Time Calculation: 39 mins

## 2021-08-06 NOTE — PROGRESS NOTES
1930- bedside shift change report given to Dewayne Vallejo RN (oncoming nurse) by Saad Barr RN (offgoing nurse). Report included the following information SBAR, Kardex, Intake/Output, MAR and Recent Results. Pt. Sitting in bed with call bell in reach and working.  Wife at bedside

## 2021-08-06 NOTE — PROGRESS NOTES
Dr. Iram Roberts present for PM rounds/evaluation of patient. Patient up in room ambulating to bathroom using rolling walker. Says he is doing well. Has been cleared by PT for discharge home. According to patient's wife he is having some trouble urinating and doesn't feel like he is emptying his bladder. Will do a post-void bladder scan. If less than 300 mL, patient may be discharged home. Per Dr. Alex Lentz greater than 600mL, patient should have a straight cath to empty bladder and will need to be monitored until this evening to make sure normal bladder function returns prior to discharge.     Zenaida Plunkett, NP

## 2021-08-06 NOTE — PROGRESS NOTES
8/6/2021 12:34 PM  Case management consult for discharge planning received. Reason for Admission: Elective- Surgery required      ICD-10-CM ICD-9-CM    1. Arthritis of left knee  M17.12 716.96 oxyCODONE IR (ROXICODONE) 5 mg immediate release tablet      traMADoL (ULTRAM) 50 mg tablet     Observation notice provided in writing to patient and/or caregiver as well as verbal explanation of the policy. Patients who are in outpatient status also receive the Observation notice. Patient has received notice and or patient representative has received via secure email, fax, or certified mail based on patient representative's preference. Signed copies placed on pt's chart. Assessment:   [x]In person with pt   Charted address and phone numbers confirmed. RUR: n/a Under Obs   Risk Level: [x]Low []Moderate []High  Value-based purchasing: [] Yes [x] No  Bundle patient: [] Yes [x] No   Specify:     Advance Directive: Full Code. [x] No AD on file. Pt was unsure if he has an AMD. CM encouraged pt to bring AMD to [facility] if he does in fact have one. [] AD on file. [] Current AD not on file. Copy requested. [] Requests AD, and referral submitted to [facility].      Healthcare Decision Maker:   Susan Norman [phone]       Assessment:    Age: 67    Sex: [x] Male []Female     Residency: [x]Private residence []Apartment []Assisted Living []LTC []Other:   Exterior Steps: 5  Interior Steps:     Lives With: [x]With spouse []Other family members []Underage children []Alone []Care provider []Other:    Prior functioning:  [x]Independent with ADLs and iADLS []Dependent with ADLs and iADLs []Partial dependence, Specify:     Prior DME required:  [x]None []RW []Cane []Crutches []Bedside commode []CPAP []Home O2 (Liter/Provider: ) []Nebulizer   []Shower Chair []Wheelchair []Hospital Bed []Hema []Stair lift []Rollator []Other:    DME available: []None [x]RW [x]Cane []Crutches [x]Bedside commode []CPAP []Home O2 (Liter/Provider: ) []Nebulizer   [x]Shower Chair []Wheelchair []Hospital Bed []Hema []Stair lift []Rollator []Other:    Rehab history: [x]None []Outpatient PT []Home Health (Provider/Date: ) []SNF (Provider/Date: ) []IPR (Provider/Date: ) []LTC (Provider/Date: ) []Hospice (Provider/Date: )  []Other:     Discharge Concerns: []Yes [x]No []Unknown   Describe:    Comments: Insurer:   Insurance Information                [place] [address] MEDIC[address] & B Phone: [phone]    Subscriber: Orquidea Morrell Subscriber#: [id number]    Group#:  Precert#:         [employer] SUPPLEMENT MEDICARE Phone:     Subscriber: Orquidea Morrell Subscriber#: [id number]    Group#: [id number] Precert#:           PCP: Lauren Edmondson   Address: [address] [place] [address] / [place] [id number]   Phone number: [phone]   Current patient: [x]Yes []No   Approximate date of last visit:2 weeks ago    Access to virtual PCP visits: [x]Yes []No    Pharmacy:  [address] [place] [address] Transport: Pt's wife       Transition of care plan:    [x] Home with Outpatient PT and outpatient follow-up   Pt aware of OP appt? [x]Yes, Provider: Leno Christine 8/10   []Not scheduled   Transport provider: Pt's wife    CM will follow. ELIO Brown    Care Management Interventions  PCP Verified by CM:  Yes Diana Barnett  Transition of Care Consult (CM Consult): Discharge Planning  MyChart Signup: No  Discharge Durable Medical Equipment: No  Physical Therapy Consult: Yes  Occupational Therapy Consult: Yes  Speech Therapy Consult: No  Current Support Network: Lives with Spouse  Discharge Location  Discharge Placement: Home with outpatient services

## 2021-08-06 NOTE — PROGRESS NOTES
Patient C/O having to urinate and not being able to. Bladder scan showed 999 ml. Benitez placed using sterile procedure. Patient tolerated well.

## 2021-08-07 VITALS
HEIGHT: 68 IN | DIASTOLIC BLOOD PRESSURE: 71 MMHG | TEMPERATURE: 98.2 F | RESPIRATION RATE: 18 BRPM | BODY MASS INDEX: 27.36 KG/M2 | WEIGHT: 180.56 LBS | HEART RATE: 82 BPM | SYSTOLIC BLOOD PRESSURE: 133 MMHG | OXYGEN SATURATION: 96 %

## 2021-08-07 PROCEDURE — 99218 HC RM OBSERVATION: CPT

## 2021-08-07 PROCEDURE — 97116 GAIT TRAINING THERAPY: CPT

## 2021-08-07 PROCEDURE — 97530 THERAPEUTIC ACTIVITIES: CPT

## 2021-08-07 PROCEDURE — 74011250637 HC RX REV CODE- 250/637: Performed by: PHYSICIAN ASSISTANT

## 2021-08-07 RX ADMIN — POLYETHYLENE GLYCOL 3350 17 G: 17 POWDER, FOR SOLUTION ORAL at 09:20

## 2021-08-07 RX ADMIN — ROSUVASTATIN CALCIUM 10 MG: 10 TABLET, COATED ORAL at 09:21

## 2021-08-07 RX ADMIN — LEVOTHYROXINE SODIUM 25 MCG: 0.03 TABLET ORAL at 06:45

## 2021-08-07 RX ADMIN — Medication 200 MG: at 09:21

## 2021-08-07 RX ADMIN — ACETAMINOPHEN 650 MG: 325 TABLET ORAL at 14:59

## 2021-08-07 RX ADMIN — FAMOTIDINE 20 MG: 20 TABLET ORAL at 09:21

## 2021-08-07 RX ADMIN — THERA TABS 1 TABLET: TAB at 09:21

## 2021-08-07 RX ADMIN — OXYCODONE 10 MG: 5 TABLET ORAL at 09:24

## 2021-08-07 RX ADMIN — DOCUSATE SODIUM 50MG AND SENNOSIDES 8.6MG 1 TABLET: 8.6; 5 TABLET, FILM COATED ORAL at 09:21

## 2021-08-07 RX ADMIN — ACETAMINOPHEN 650 MG: 325 TABLET ORAL at 06:45

## 2021-08-07 RX ADMIN — CELECOXIB 200 MG: 100 CAPSULE ORAL at 09:21

## 2021-08-07 RX ADMIN — Medication 10 ML: at 06:45

## 2021-08-07 RX ADMIN — ASPIRIN 81 MG: 81 TABLET, COATED ORAL at 09:21

## 2021-08-07 RX ADMIN — ACETAMINOPHEN 650 MG: 325 TABLET ORAL at 00:00

## 2021-08-07 RX ADMIN — OXYCODONE 10 MG: 5 TABLET ORAL at 14:59

## 2021-08-07 NOTE — PROGRESS NOTES
TOTAL KNEE ARTHROPLASTY DAILY NOTE     ASSESSMENT / PLAN :   1. Pain Control : Excellent - Able to sleep and participate with therapy  2. Overnight Issues : none reported. \"I slept great!\"  3. Wound or incisional issue : Healing incision with no visible drainage  4. Therapy / Weight Bearing Recommendations : Weight bear as tolerated with use of a walker and two person assist while mobilizing  5. DVT Prophylaxis : Mechanical and Aspirin and mechanical lower extremity compression device  6. Disposition : Home - outpatient PT starts Tuesday  7. Medical Concerns : Benitez catheter - follow up with urology, Dr. Thuan Busch 7-10 days post-op  8. Comments :  Patient has cleared PT - discharge home this afternoon. POD  2 Days Post-Op s/p Procedure(s):  LEFT TOTAL KNEE ARTHROPLASTY MAKOPLASTY     SUBJECTIVE :     Overnight complaints : none. OBJECTIVE :     Vitals:    08/06/21 2230 08/07/21 0426 08/07/21 0918 08/07/21 1200   BP: (!) 147/81 (!) 161/90 129/83 (!) 142/75   Pulse: 78 83 85 74   Resp: 18 18 16 16   Temp: 98.8 °F (37.1 °C) 98.2 °F (36.8 °C) 98.1 °F (36.7 °C) 98.3 °F (36.8 °C)   SpO2: 93% 97% 96% 97%   Weight:       Height:           Alert and oriented x3. Examination of the left knee reveals that the dressing is clean, dry and intact. Motor Exam : Pt is able to perform a straight leg raise. Sensation is intact to light touch. No calf pain. MEDS / LABS :     Key Anti-Platelet Anticoagulant Meds             aspirin delayed-release 81 mg tablet (Taking) Take 1 Tablet by mouth two (2) times a day. aspirin delayed-release 81 mg tablet Take 81 mg by mouth daily. Labs:  No results for input(s): HGB, HCT, INR, NA, K, CL, CO2, BUN, CREA, GLU, HGBEXT, HCTEXT, INREXT in the last 72 hours.    Patient mobility  Gait  Base of Support: Narrowed  Speed/Karley: Pace decreased (<100 feet/min)  Step Length: Left shortened  Gait Abnormalities: Decreased step clearance  Ambulation - Level of Assistance: Contact guard assistance  Distance (ft): 130 Feet (ft)  Assistive Device: Gait belt, Walker, rolling  Rail Use: Left  (lateral technique)  Stairs - Level of Assistance: Contact guard assistance  Number of Stairs Trained: Audra Diggs MD  Cell [phone]  Behzad Jaimes PA-C  Cell [phone]  Medical Assistants: Ja Hurt [phone]                 Surgery Scheduler: Chandler Milton, [address] [phone] ext [phone]

## 2021-08-07 NOTE — PROGRESS NOTES
Problem: Mobility Impaired (Adult and Pediatric)  Goal: *Acute Goals and Plan of Care (Insert Text)  Description: FUNCTIONAL STATUS PRIOR TO ADMISSION: Patient was independent and active without use of DME.    HOME SUPPORT PRIOR TO ADMISSION: The patient lived with wife but did not require assist.    Physical Therapy Goals  Initiated 8/5/2021    1. Patient will move from supine to sit and sit to supine  in bed with independence within 4 days. 2. Patient will perform sit to stand with modified independence within 4 days. 3. Patient will ambulate with modified independence for 100 feet with the least restrictive device within 4 days. 4. Patient will ascend/descend 4 stairs with 1 handrail(s) with minimal assistance/contact guard assist within 4 days. 5. Patient will perform home exercise program per protocol with independence within 4 days. 6. Patient will demonstrate AROM 0-90 degrees in operative joint within 4 days. Note:   PHYSICAL THERAPY TREATMENT  Patient: Melly Funez (71 y.o. male)  Date: 8/7/2021  Diagnosis: Primary osteoarthritis of left knee [M17.12] <principal problem not specified>  Procedure(s) (LRB):  LEFT TOTAL KNEE ARTHROPLASTY MAKOPLASTY (Left) 2 Days Post-Op  Precautions: Fall, WBAT (lmit flexion to 90 degrees on operative knee)  Chart, physical therapy assessment, plan of care and goals were reviewed. ASSESSMENT  Patient continues with skilled PT services. Pt supine to sit to stand with CGA. Pt ambulated 130ft with RW CGA. Pt CGA back to bed. Pt has cleared on steps. Home exercise program reviewed. Pt is cleared for D/C by PT. PLAN :  Patient continues to benefit from skilled intervention to address the above impairments. Continue treatment per established plan of care. to address goals. Recommendation for discharge: (in order for the patient to meet his/her long term goals)  Per MD recommendation.     This discharge recommendation:  Has been made in collaboration with the attending provider and/or case management    IF patient discharges home will need the following DME: rolling walker       SUBJECTIVE:       OBJECTIVE DATA SUMMARY:   Critical Behavior:  Neurologic State: Alert  Orientation Level: Oriented X4  Cognition: Appropriate decision making, Appropriate for age attention/concentration, Appropriate safety awareness, Follows commands  Safety/Judgement: Awareness of environment, Driving appropriateness, Fall prevention, Insight into deficits, Home safety, Good awareness of safety precautions  Functional Mobility Training:  Bed Mobility:     Supine to Sit: Contact guard assistance  Sit to Supine: Contact guard assistance           Transfers:  Sit to Stand: Contact guard assistance  Stand to Sit: Contact guard assistance                             Balance:  Sitting: Intact  Standing: With support  Standing - Static: Good  Ambulation/Gait Training:  Distance (ft): 130 Feet (ft)  Assistive Device: Gait belt;Walker, rolling  Ambulation - Level of Assistance: Contact guard assistance        Gait Abnormalities: Decreased step clearance        Base of Support: Narrowed     Speed/Karley: Pace decreased (<100 feet/min)  Step Length: Left shortened               Activity Tolerance:   Good    After treatment patient left in no apparent distress:   Supine in bed    COMMUNICATION/COLLABORATION:   The patients plan of care was discussed with: Physical therapist.     Diego Joe PTA   Time Calculation: 23 mins

## 2021-08-09 ENCOUNTER — TELEPHONE (OUTPATIENT)
Dept: SURGERY | Age: 76
End: 2021-08-09

## 2021-08-09 NOTE — TELEPHONE ENCOUNTER
Post Discharge Phone placed to patient after Joint Replacement surgery   Spoke with patient pt has catheter in place, f/u 8/13 with urology    Patient is taking narcotics and tylenol  States pain is tolerable and pain medication is effective.    Patient was able to fill prescriptions, medication questions answered     States discharge instructions were clear and easy to understand has no questions  Patient is using a walker without difficulty, moving every hour  Able to do exercises regularly  Bruising is minimal  Swelling is minimal  Patient is elevating leg and using ice with good relief  States dressing is intact without drainage  Denies N/V, appetite is good  Constipation is none, +BM, taking stool softener and miralax  Follow up appointment is scheduled with surgeon   PT is scheduled tomorrow   Low grade fever, resolved today, encouraged increase water intake and IS use  Denies cough, chest pain, SOB  Denies any unusual symptoms  Discussed calling surgeon or PCP for concerns  Reminded patient to fill out survey  Opportunity given for patient to ask questions

## 2021-08-17 ENCOUNTER — APPOINTMENT (OUTPATIENT)
Dept: CT IMAGING | Age: 76
End: 2021-08-17
Attending: EMERGENCY MEDICINE
Payer: MEDICARE

## 2021-08-17 ENCOUNTER — HOSPITAL ENCOUNTER (EMERGENCY)
Age: 76
Discharge: HOME | End: 2021-08-17
Attending: EMERGENCY MEDICINE | Admitting: EMERGENCY MEDICINE
Payer: MEDICARE

## 2021-08-17 VITALS
BODY MASS INDEX: 27.43 KG/M2 | WEIGHT: 181 LBS | OXYGEN SATURATION: 97 % | HEART RATE: 84 BPM | SYSTOLIC BLOOD PRESSURE: 114 MMHG | TEMPERATURE: 96.9 F | HEIGHT: 68 IN | DIASTOLIC BLOOD PRESSURE: 71 MMHG | RESPIRATION RATE: 16 BRPM

## 2021-08-17 DIAGNOSIS — K62.5 RECTAL BLEEDING: Primary | ICD-10-CM

## 2021-08-17 LAB
ABO + RH BLD: NORMAL
ALBUMIN SERPL-MCNC: 2.7 G/DL (ref 3.5–5)
ALBUMIN/GLOB SERPL: 0.5 {RATIO} (ref 1.1–2.2)
ALP SERPL-CCNC: 93 U/L (ref 45–117)
ALT SERPL-CCNC: 47 U/L (ref 12–78)
ANION GAP SERPL CALC-SCNC: 7 MMOL/L (ref 5–15)
APTT PPP: 22.1 SEC (ref 22.1–31)
AST SERPL-CCNC: 98 U/L (ref 15–37)
BASOPHILS # BLD: 0.1 K/UL (ref 0–0.1)
BASOPHILS NFR BLD: 1 % (ref 0–1)
BILIRUB SERPL-MCNC: 0.5 MG/DL (ref 0.2–1)
BLOOD GROUP ANTIBODIES SERPL: NORMAL
BUN SERPL-MCNC: 22 MG/DL (ref 6–20)
BUN/CREAT SERPL: 20 (ref 12–20)
CALCIUM SERPL-MCNC: 8.9 MG/DL (ref 8.5–10.1)
CHLORIDE SERPL-SCNC: 105 MMOL/L (ref 97–108)
CO2 SERPL-SCNC: 24 MMOL/L (ref 21–32)
COMMENT, HOLDF: NORMAL
CREAT SERPL-MCNC: 1.09 MG/DL (ref 0.7–1.3)
DIFFERENTIAL METHOD BLD: ABNORMAL
EOSINOPHIL # BLD: 0 K/UL (ref 0–0.4)
EOSINOPHIL NFR BLD: 0 % (ref 0–7)
ERYTHROCYTE [DISTWIDTH] IN BLOOD BY AUTOMATED COUNT: 13.5 % (ref 11.5–14.5)
GLOBULIN SER CALC-MCNC: 5.3 G/DL (ref 2–4)
GLUCOSE SERPL-MCNC: 95 MG/DL (ref 65–100)
HCT VFR BLD AUTO: 36.6 % (ref 36.6–50.3)
HGB BLD-MCNC: 12.2 G/DL (ref 12.1–17)
IMM GRANULOCYTES # BLD AUTO: 0.5 K/UL (ref 0–0.04)
IMM GRANULOCYTES NFR BLD AUTO: 4 % (ref 0–0.5)
INR PPP: 1.1 (ref 0.9–1.1)
LYMPHOCYTES # BLD: 1.9 K/UL (ref 0.8–3.5)
LYMPHOCYTES NFR BLD: 16 % (ref 12–49)
MCH RBC QN AUTO: 33.3 PG (ref 26–34)
MCHC RBC AUTO-ENTMCNC: 33.3 G/DL (ref 30–36.5)
MCV RBC AUTO: 100 FL (ref 80–99)
MONOCYTES # BLD: 1.3 K/UL (ref 0–1)
MONOCYTES NFR BLD: 11 % (ref 5–13)
NEUTS SEG # BLD: 8.2 K/UL (ref 1.8–8)
NEUTS SEG NFR BLD: 68 % (ref 32–75)
NRBC # BLD: 0 K/UL (ref 0–0.01)
NRBC BLD-RTO: 0 PER 100 WBC
PLATELET # BLD AUTO: 588 K/UL (ref 150–400)
PMV BLD AUTO: 8.8 FL (ref 8.9–12.9)
POTASSIUM SERPL-SCNC: 4.4 MMOL/L (ref 3.5–5.1)
PROT SERPL-MCNC: 8 G/DL (ref 6.4–8.2)
PROTHROMBIN TIME: 10.9 SEC (ref 9–11.1)
RBC # BLD AUTO: 3.66 M/UL (ref 4.1–5.7)
RBC MORPH BLD: ABNORMAL
SAMPLES BEING HELD,HOLD: NORMAL
SODIUM SERPL-SCNC: 136 MMOL/L (ref 136–145)
SPECIMEN EXP DATE BLD: NORMAL
THERAPEUTIC RANGE,PTTT: NORMAL SECS (ref 58–77)
WBC # BLD AUTO: 12 K/UL (ref 4.1–11.1)
WBC MORPH BLD: ABNORMAL

## 2021-08-17 PROCEDURE — 74178 CT ABD&PLV WO CNTR FLWD CNTR: CPT

## 2021-08-17 PROCEDURE — 85610 PROTHROMBIN TIME: CPT

## 2021-08-17 PROCEDURE — 99281 EMR DPT VST MAYX REQ PHY/QHP: CPT

## 2021-08-17 PROCEDURE — 80053 COMPREHEN METABOLIC PANEL: CPT

## 2021-08-17 PROCEDURE — 85730 THROMBOPLASTIN TIME PARTIAL: CPT

## 2021-08-17 PROCEDURE — 86901 BLOOD TYPING SEROLOGIC RH(D): CPT

## 2021-08-17 PROCEDURE — 74011000636 HC RX REV CODE- 636: Performed by: STUDENT IN AN ORGANIZED HEALTH CARE EDUCATION/TRAINING PROGRAM

## 2021-08-17 PROCEDURE — 36415 COLL VENOUS BLD VENIPUNCTURE: CPT

## 2021-08-17 PROCEDURE — 85025 COMPLETE CBC W/AUTO DIFF WBC: CPT

## 2021-08-17 RX ADMIN — IOPAMIDOL 100 ML: 755 INJECTION, SOLUTION INTRAVENOUS at 11:58

## 2021-08-17 NOTE — ED TRIAGE NOTES
Patient presents with one episode of black tarry stools. Pt is post op for TKR 8/8. Taking aspirin. Denies lightheaded, SOB CP, or dizziness.

## 2021-08-17 NOTE — DISCHARGE INSTRUCTIONS
Please discontinue any NSAIDs with exception of your aspirin. Tylenol is okay. Thank you for allowing us to provide you with medical care today. We realize that you have many choices for your emergency care needs. We thank you for choosing [facility]. Please choose us in the future for any continued health care needs. We hope we addressed all of your medical concerns. We strive to provide excellent quality care in the Emergency Department. Anything less than excellent does not meet our expectations. The exam and treatment you received in the Emergency Department were for an emergent problem and are not intended as complete care. It is important that you follow up with a doctor, nurse practitioner, or physician's assistant for ongoing care. If your symptoms worsen or you do not improve as expected and you are unable to reach your usual health care provider, you should return to the Emergency Department. We are available 24 hours a day. Take this sheet with you when you go to your follow-up visit. If you have any problem arranging the follow-up visit, contact the Emergency Department immediately. Make an appointment your family doctor for follow up of this visit. Return to the ER if you are unable to be seen in a timely manner.

## 2021-08-17 NOTE — ED PROVIDER NOTES
Patient is a pleasant 76-year-old male with a history of high cholesterol hypothyroidism with recent knee surgery who is on baby aspirin as VTE prophylaxis presents to the emergency department today with chief complaint of dark tarry stool x1 this morning. No history of GI bleeding. In the past to use significant NSAIDs associated with exercising but has not been doing it for some time now. No fevers or chills. No chest pain or shortness of breath or presyncope. The history is provided by the patient and medical records. Rectal Bleeding   This is a new problem. Episode onset: This morning. Associated symptoms include abdominal pain (Resolved with bowel movement). Pertinent negatives include no dysuria, no abdominal distention, no fever, no nausea, no back pain, no vomiting and no diarrhea. Past Medical History:   Diagnosis Date    COVID-19 vaccine series completed 2020    High cholesterol     Hypothyroidism        Past Surgical History:   Procedure Laterality Date    HX COLONOSCOPY      HX VASECTOMY           No family history on file. Social History     Socioeconomic History    Marital status:      Spouse name: Not on file    Number of children: Not on file    Years of education: Not on file    Highest education level: Not on file   Occupational History    Not on file   Tobacco Use    Smoking status: Former Smoker     Types: Cigarettes    Smokeless tobacco: Never Used   Substance and Sexual Activity    Alcohol use:  Yes     Alcohol/week: 3.0 standard drinks     Types: 3 Cans of beer per week    Drug use: Never    Sexual activity: Not on file   Other Topics Concern    Not on file   Social History Narrative    Not on file     Social Determinants of Health     Financial Resource Strain:     Difficulty of Paying Living Expenses:    Food Insecurity:     Worried About Running Out of Food in the Last Year:     [address] in the Last Year:    Transportation Needs:     Lack of Transportation (Medical):  Lack of Transportation (Non-Medical):    Physical Activity:     Days of Exercise per Week:     Minutes of Exercise per Session:    Stress:     Feeling of Stress :    Social Connections:     Frequency of Communication with Friends and Family:     Frequency of Social Gatherings with Friends and Family:     Attends [employer] Services:     Active Member of Clubs or Organizations:     Attends Club or Organization Meetings:     Marital Status:    Intimate Partner Violence:     Fear of Current or Ex-Partner:     Emotionally Abused:     Physically Abused:     Sexually Abused: ALLERGIES: Patient has no known allergies. Review of Systems   Constitutional: Negative for fatigue and fever. HENT: Negative for sneezing and sore throat. Respiratory: Negative for cough and shortness of breath. Cardiovascular: Negative for chest pain and leg swelling. Gastrointestinal: Positive for abdominal pain (Resolved with bowel movement) and anal bleeding. Negative for abdominal distention, diarrhea, nausea and vomiting. Genitourinary: Negative for difficulty urinating and dysuria. Musculoskeletal: Negative for arthralgias, back pain and myalgias. Skin: Negative for color change and rash. Neurological: Negative for weakness and headaches. Psychiatric/Behavioral: Negative for agitation and behavioral problems. Vitals:    08/17/21 1011   BP: 114/71   Pulse: 84   Resp: 16   Temp: 96.9 °F (36.1 °C)   SpO2: 97%   Weight: 82.1 kg (181 lb)   Height: 5' 8\" (1.727 m)            Physical Exam  Vitals and nursing note reviewed. Constitutional:       General: He is not in acute distress. Appearance: Normal appearance. He is well-developed. He is not ill-appearing, toxic-appearing or diaphoretic. HENT:      Head: Normocephalic and atraumatic. Nose: Nose normal.      Mouth/Throat:      Mouth: Mucous membranes are moist.      Pharynx: Oropharynx is clear.    Eyes: Extraocular Movements: Extraocular movements intact. Conjunctiva/sclera: Conjunctivae normal.      Pupils: Pupils are equal, round, and reactive to light. Cardiovascular:      Rate and Rhythm: Normal rate and regular rhythm. Pulses: Normal pulses. Heart sounds: No murmur heard. Pulmonary:      Effort: Pulmonary effort is normal. No respiratory distress. Breath sounds: Normal breath sounds. No wheezing. Chest:      Chest wall: No mass or tenderness. Abdominal:      General: There is no distension. Palpations: Abdomen is soft. Tenderness: There is no abdominal tenderness. There is no guarding or rebound. Genitourinary:     Comments: Benitez in place  Musculoskeletal:         General: No swelling, tenderness, deformity or signs of injury. Normal range of motion. Cervical back: Normal range of motion and neck supple. No rigidity. No muscular tenderness. Right lower leg: No tenderness. No edema. Left lower leg: No tenderness. No edema. Comments: Well-healing postoperative changes of the left knee   Skin:     General: Skin is warm and dry. Capillary Refill: Capillary refill takes less than 2 seconds. Neurological:      General: No focal deficit present. Mental Status: He is alert and oriented to person, place, and time. Psychiatric:         Mood and Affect: Mood normal.         Behavior: Behavior normal.          MDM  Number of Diagnoses or Management Options  Rectal bleeding  Diagnosis management comments: 48-year-old male presents as above with an episode of melena. His work-up has been reassuring but with concern for potential renal cancer. Plan to discharge with instructions to follow-up with urology, GI, return if needed. We discussed the abnormal imaging and need to follow-up with urology for which she already has an appointment for his Benitez.        Amount and/or Complexity of Data Reviewed  Clinical lab tests: reviewed  Tests in the radiology section of CPT®: reviewed      ED Course as of Aug 17 1019   Tue Aug 17, 2021   1010 Had episode of dark, tarry stool this morning. States he recently had L knee surgery, has been on aspirin which he states he has been on his whole life. Notes he has had \"stomach issues\", chills, trouble sleeping while weaning down from narcotics he was rx'd during surgery. Denies CP, SOB. Notes he is vaccinated for COVID, denies known sick contact. 10:11 AM  I have evaluated the patient as the Provider in Triage. I have reviewed His vital signs and the triage nurse assessment. I have talked with the patient and any available family and advised that I am the provider in triage and have ordered the appropriate study to initiate their work up based on the clinical presentation during my assessment. I have advised that the patient will be accommodated in the Main ED as soon as possible. I have also requested to contact the triage nurse or myself immediately if the patient experiences any changes in their condition during this brief waiting period.   Luis Han    [MW]      ED Course User Index  [MW] Ailyn Green, [place]       Procedures

## 2021-10-22 NOTE — PERIOP NOTES
Informed patient of COVID requirements, patient to complete COVID curbside testing at [facility] Wednesday, October 27th between [id number]. Patient verbalized understanding that COVID test is required to proceed with procedure.

## 2021-10-25 RX ORDER — OMEPRAZOLE 10 MG/1
40 CAPSULE, DELAYED RELEASE ORAL DAILY
Status: ON HOLD | COMMUNITY
End: 2022-04-22

## 2021-10-25 RX ORDER — DUTASTERIDE 0.5 MG/1
0.5 CAPSULE, LIQUID FILLED ORAL DAILY
Status: ON HOLD | COMMUNITY
End: 2022-04-22

## 2021-10-25 NOTE — PERIOP NOTES
[address] [place] [address] Dr Prakash Preprocedure Instructions      1. On the day of your surgery, please report to registration located on the 2nd floor of the  [facility]. yes    2. You must have a responsible adult to drive you to the hospital, stay at the hospital during your procedure and drive you home. If they leave your procedure will not be started (no exceptions). yes    3. Do not have anything to eat or drink (including water, gum, mints, coffee, and juice) after midnight. This does not apply to the medications you were instructed to take by your physician. yes  If you are currently taking Plavix, Coumadin, Aspirin, or other blood-thinning agents, contact your physician for special instructions. yes    4. If you are having a procedure that requires bowel prep: We recommend that you have only clear liquids the day before your procedure and begin your bowel prep by 5:00 pm.  You may continue to drink clear liquids until midnight. If for any reason you are not able to complete your prep please notify your physician immediately. yes    5. Have a list of all current medications, including vitamins, herbal supplements and any other over the counter medications. yes    6. If you wear glasses, contacts, dentures and/or hearing aids, they may be removed prior to procedure, please bring a case to store them in. yes    7. You should understand that if you do not follow these instructions your procedure may be cancelled. If your physical condition changes (I.e. fever, cold or flu) please contact your doctor as soon as possible. 8. It is important that you be on time. If for any reason you are unable to keep your appointment please call [phone] the day of or your physicians office prior to your scheduled procedure    9.  Have you received your COVID Vaccine? yes If no, you will need to receive a COVID test/swab here at [facility] the MOB parking lot Monday - Friday 8a - 11am. There are no Saturday or Sunday swabbing at any [facility]. (patient verbalizes understanding) yes    Pt verbalizes understanding that his wife must be present for the entire duration of the procedure.

## 2021-10-27 ENCOUNTER — HOSPITAL ENCOUNTER (OUTPATIENT)
Dept: LAB | Age: 76
Discharge: HOME | End: 2021-10-27
Payer: MEDICARE

## 2021-10-27 ENCOUNTER — TRANSCRIBE ORDER (OUTPATIENT)
Dept: REGISTRATION | Age: 76
End: 2021-10-27

## 2021-10-27 DIAGNOSIS — Z01.812 PRE-PROCEDURAL LABORATORY EXAMINATIONS: ICD-10-CM

## 2021-10-27 DIAGNOSIS — Z01.812 PRE-PROCEDURAL LABORATORY EXAMINATIONS: Primary | ICD-10-CM

## 2021-10-27 PROCEDURE — U0005 INFEC AGEN DETEC AMPLI PROBE: HCPCS

## 2021-10-28 LAB
SARS-COV-2, XPLCVT: NOT DETECTED
SOURCE, COVRS: NORMAL

## 2021-10-29 ENCOUNTER — HOSPITAL ENCOUNTER (OUTPATIENT)
Age: 76
Setting detail: OUTPATIENT SURGERY
Discharge: HOME | End: 2021-10-29
Attending: SPECIALIST | Admitting: SPECIALIST
Payer: MEDICARE

## 2021-10-29 ENCOUNTER — ANESTHESIA (OUTPATIENT)
Dept: ENDOSCOPY | Age: 76
End: 2021-10-29
Payer: MEDICARE

## 2021-10-29 ENCOUNTER — ANESTHESIA EVENT (OUTPATIENT)
Dept: ENDOSCOPY | Age: 76
End: 2021-10-29
Payer: MEDICARE

## 2021-10-29 VITALS
SYSTOLIC BLOOD PRESSURE: 111 MMHG | RESPIRATION RATE: 22 BRPM | HEART RATE: 62 BPM | TEMPERATURE: 97.7 F | DIASTOLIC BLOOD PRESSURE: 72 MMHG | HEIGHT: 68 IN | BODY MASS INDEX: 26.66 KG/M2 | OXYGEN SATURATION: 100 % | WEIGHT: 175.93 LBS

## 2021-10-29 PROCEDURE — 76040000019: Performed by: SPECIALIST

## 2021-10-29 PROCEDURE — 74011250636 HC RX REV CODE- 250/636: Performed by: NURSE ANESTHETIST, CERTIFIED REGISTERED

## 2021-10-29 PROCEDURE — 88342 IMHCHEM/IMCYTCHM 1ST ANTB: CPT

## 2021-10-29 PROCEDURE — 74011000250 HC RX REV CODE- 250: Performed by: NURSE ANESTHETIST, CERTIFIED REGISTERED

## 2021-10-29 PROCEDURE — 77030021593 HC FCPS BIOP ENDOSC BSC -A: Performed by: SPECIALIST

## 2021-10-29 PROCEDURE — 2709999900 HC NON-CHARGEABLE SUPPLY: Performed by: SPECIALIST

## 2021-10-29 PROCEDURE — 76060000031 HC ANESTHESIA FIRST 0.5 HR: Performed by: SPECIALIST

## 2021-10-29 PROCEDURE — 77030013992 HC SNR POLYP ENDOSC BSC -B: Performed by: SPECIALIST

## 2021-10-29 PROCEDURE — 88305 TISSUE EXAM BY PATHOLOGIST: CPT

## 2021-10-29 RX ORDER — MIDAZOLAM HYDROCHLORIDE 1 MG/ML
.25-5 INJECTION, SOLUTION INTRAMUSCULAR; INTRAVENOUS AS NEEDED
Status: DISCONTINUED | OUTPATIENT
Start: 2021-10-29 | End: 2021-10-29 | Stop reason: HOSPADM

## 2021-10-29 RX ORDER — NALOXONE HYDROCHLORIDE 0.4 MG/ML
0.4 INJECTION, SOLUTION INTRAMUSCULAR; INTRAVENOUS; SUBCUTANEOUS
Status: DISCONTINUED | OUTPATIENT
Start: 2021-10-29 | End: 2021-10-29 | Stop reason: HOSPADM

## 2021-10-29 RX ORDER — FLUMAZENIL 0.1 MG/ML
0.2 INJECTION INTRAVENOUS
Status: DISCONTINUED | OUTPATIENT
Start: 2021-10-29 | End: 2021-10-29 | Stop reason: HOSPADM

## 2021-10-29 RX ORDER — SODIUM CHLORIDE 9 MG/ML
50 INJECTION, SOLUTION INTRAVENOUS CONTINUOUS
Status: DISCONTINUED | OUTPATIENT
Start: 2021-10-29 | End: 2021-10-29 | Stop reason: HOSPADM

## 2021-10-29 RX ORDER — PROPOFOL 10 MG/ML
INJECTION, EMULSION INTRAVENOUS AS NEEDED
Status: DISCONTINUED | OUTPATIENT
Start: 2021-10-29 | End: 2021-10-29 | Stop reason: HOSPADM

## 2021-10-29 RX ORDER — SODIUM CHLORIDE 9 MG/ML
INJECTION, SOLUTION INTRAVENOUS
Status: DISCONTINUED | OUTPATIENT
Start: 2021-10-29 | End: 2021-10-29 | Stop reason: HOSPADM

## 2021-10-29 RX ORDER — DEXTROMETHORPHAN/PSEUDOEPHED 2.5-7.5/.8
1.2 DROPS ORAL
Status: DISCONTINUED | OUTPATIENT
Start: 2021-10-29 | End: 2021-10-29 | Stop reason: HOSPADM

## 2021-10-29 RX ORDER — PROPOFOL 10 MG/ML
INJECTION, EMULSION INTRAVENOUS
Status: DISCONTINUED | OUTPATIENT
Start: 2021-10-29 | End: 2021-10-29 | Stop reason: HOSPADM

## 2021-10-29 RX ORDER — FENTANYL CITRATE 50 UG/ML
25 INJECTION, SOLUTION INTRAMUSCULAR; INTRAVENOUS AS NEEDED
Status: DISCONTINUED | OUTPATIENT
Start: 2021-10-29 | End: 2021-10-29 | Stop reason: HOSPADM

## 2021-10-29 RX ORDER — LIDOCAINE HYDROCHLORIDE 20 MG/ML
INJECTION, SOLUTION EPIDURAL; INFILTRATION; INTRACAUDAL; PERINEURAL AS NEEDED
Status: DISCONTINUED | OUTPATIENT
Start: 2021-10-29 | End: 2021-10-29 | Stop reason: HOSPADM

## 2021-10-29 RX ADMIN — LIDOCAINE HYDROCHLORIDE 40 MG: 20 INJECTION, SOLUTION INTRAVENOUS at 07:09

## 2021-10-29 RX ADMIN — LIDOCAINE HYDROCHLORIDE 60 MG: 20 INJECTION, SOLUTION INTRAVENOUS at 07:10

## 2021-10-29 RX ADMIN — PROPOFOL INJECTABLE EMULSION 140 MCG/KG/MIN: 10 INJECTION, EMULSION INTRAVENOUS at 07:08

## 2021-10-29 RX ADMIN — PROPOFOL 80 MG: 10 INJECTION, EMULSION INTRAVENOUS at 07:09

## 2021-10-29 RX ADMIN — SODIUM CHLORIDE: 9 INJECTION, SOLUTION INTRAVENOUS at 07:02

## 2021-10-29 NOTE — ANESTHESIA PREPROCEDURE EVALUATION
Relevant Problems   No relevant active problems       Anesthetic History   No history of anesthetic complications            Review of Systems / Medical History  Patient summary reviewed and pertinent labs reviewed    Pulmonary        Sleep apnea (lost weight): No treatment           Neuro/Psych   Within defined limits           Cardiovascular              Hyperlipidemia    Exercise tolerance: >4 METS  Comments: H/o syncopal episodes now with loop recorder-cleared, no arrhythmias.    EF 60%   GI/Hepatic/Renal     GERD: well controlled    Renal disease: CRI       Endo/Other      Hypothyroidism: well controlled  Arthritis     Other Findings   Comments: Right kidney mass-undergoing work-up           Physical Exam    Airway  Mallampati: II  TM Distance: 4 - 6 cm  Neck ROM: normal range of motion   Mouth opening: Normal     Cardiovascular    Rhythm: regular  Rate: normal         Dental    Dentition: Upper dentition intact, Lower dentition intact and Caps/crowns     Pulmonary  Breath sounds clear to auscultation               Abdominal         Other Findings            Anesthetic Plan    ASA: 2  Anesthesia type: MAC          Induction: Intravenous  Anesthetic plan and risks discussed with: Patient

## 2021-10-29 NOTE — PROCEDURES
[facility] KEVIN Agudelo MD  [phone]      2021    Esophagogastroduodenoscopy & Colonoscopy Procedure Note  Erlin Uriostegui  : 1945  [address] [place] [address] Medical Record Number: [id number]      Indications:    Melena/hematochezia Abdominal pain  Referring Physician:  Fuad Paz MD  Anesthesia/Sedation: Conscious Sedation/Moderate Sedation/MAC  Endoscopist:  Dr. Ambreen Lea  Complications:  None  Estimated Blood Loss:  None    Permit:  The indications, risks, benefits and alternatives were reviewed with the patient or their decision maker who was provided an opportunity to ask questions and all questions were answered. The specific risks of esophagogastroduodenoscopy with conscious sedation were reviewed, including but not limited to anesthetic complication, bleeding, adverse drug reaction, missed lesion, infection, IV site reactions, and intestinal perforation which would lead to the need for surgical repair. Alternatives to EGD and colonoscopy including radiographic imaging, observation without testing, or laboratory testing were reviewed as well as the limitations of those alternatives discussed. After considering the options and having all their questions answered, the patient or their decision maker provided both verbal and written consent to proceed. -----------EGD------------   Procedure in Detail:  After obtaining informed consent, positioning of the patient in the left lateral decubitus position, and conduction of a pre-procedure pause or \"time out\" the endoscope was introduced into the mouth and advanced to the duodenum. A careful inspection was made, and findings or interventions are described below. Findings:   Esophagus:normal  Stomach: Diffuse hemorrhagic gastropathy in the antrum. Overlying hematin and friability with oozing upon contact with 'scope.   No neoplastic appearing mucosa and no full thickness ulceration but some deformity of the mucosa might suggest a previous gastric ulcer. Cold forceps biopsies obtained for histology and hemostasis confirmed. Duodenum/jejunum: Just past the bulb there is some mucosal deformity which might suggest previous ulcer but otherwise duodenal mucosa normal.        ----------Colonoscopy-----------    Procedure in Detail:  After obtaining informed consent, positioning of the patient in the left lateral decubitus position, and conduction of a pre-procedure pause or \"time out\" the endoscope was introduced into the anus and advanced to the cecum, which was identified by the ileocecal valve and appendiceal orifice. The quality of the colonic preparation was good. A careful inspection was made as the colonoscope was withdrawn, findings and interventions are described below. Findings:   5mm sessile polyp sigmoid, removed with cold snare, retrieved, and hemostasis confirmed. There is diverticulosis in the sigmoid colon without complications such as bleeding, inflammatory change, or luminal narrowing. In the rectum, small internal hemorrhoids are noted without bleeding.      ------------------------------  Specimens:    See above    Complications:   None; patient tolerated the procedure well. Impressions:  EGD:  Gastritis  Colonoscopy: Colon polyp, hemorrhoids, diverticulosis. Recommendations:     - Await pathology.  -No NSAIDs, stop aspirin unless significant indication for antiplatelet therapy and in that case might suggest alternate agent to avoid LOVETT inhibition  -PPI daily for minimum 2 months. Thank you for entrusting me with this patient's care. Please do not hesitate to contact me with any questions or if I can be of assistance with any of your other patients' GI needs. Signed By: Anitra Briceno MD                        October 29, 2021    Surgical assistant none. Implants none unless specified.

## 2021-10-29 NOTE — H&P
Date: 2021 10:10 AM   Patient Name: Olaf Vo   Account #: [id number]    Gender: Male    (age): 1945 (76)       Provider:     Tricia Vizcarra PA-C        Referring Physician:     [place]  [id number] [facility]. Monica MENDOZA [address]  [phone] (phone)  [phone] (fax)        Chief Complaint: melena           History of Present Illness:   Seen in the ED recenty for one episode of melena. CT a/p normal but suggested RCC which he was in the process of having worked up. Had TKR with Dr. Herlinda Sampson 4 weeks ago. Started on 165mg ASA but this was discontinued after the ER visit. Was then Given Rx for eliquis. However, melena recurred, stopped the Eliquis but then resumed and so far no further evidence of bleeding. His Hgb was normal in the ED. No NSAID use. Feeling OK though says his stomach just feels a little qeasy. No PPI. Was not taking iron. Denies heartburn/regurgitation. He is UTD with colonoscopies but is due this year for surveillance as he has hx of polyps. Sees Urology next week and f/u with Ortho on .    ? Seen in the ED recenty for one episode of melena. CT a/p normal but suggested RCC which he was in the process of having worked up. ?  Had TKR with Dr. Herlinda Sampson 4 weeks ago. Started on 165mg ASA but this was discontinued after the ER visit. Was then Given Rx for eliquis. However, melena recurred, stopped the Eliquis but then resumed and so far no further evidence of bleeding. His Hgb was normal in the ED. No NSAID use. Feeling OK though says his stomach just feels a little qeasy. No PPI. Was not taking iron. Denies heartburn/regurgitation. He is UTD with colonoscopies but is due this year for surveillance as he has hx of polyps. Sees Urology next week and f/u with Ortho on .           Past Medical History      Medical Conditions: Arthritis  Cardiac implant/device  Hemorrhoids  High cholesterol  Sleep apnea   Surgical Procedures:    Dx Studies: Colonoscopy, 5/20/2016  CT Scan, 8/17/2021   Medications: Adult Aspirin Regimen 81 mg Take 1 tablet by mouth once a day as directed  coQ10 (ubiquinol) 200 mg 1 tablet onc a day  Eliquis 2.5 mg Take 1 tablet by mouth once a day as directed for 30 days  Flomax 0.4 mg 1 tablet once a day  levothyroxine 175 mcg Take 1 tablet by mouth once a day as directed  One-A-Day Men's 50 Plus 400- mcg 1 tablet once a day  rosuvastatin 10 mg 1 tablet once a day   Allergies: Patient has no known allergies or drug allergies   Immunizations: No Immunizations      Social History      Alcohol: Alcohol consumption: Weekly 3 times a week. Tobacco: Former smoker   Drugs: None   Exercise: Exercise 3 or more times a week 3 times a week. Caffeine: Daily. Marital Status:          Occupation:               Family History No history of Colon Cancer, Esophogeal Cancer, GI Cancers, IBD (Crohn's or UC), Liver disease  Sister: Diagnosed with Family history of colon polyps; Review of Systems:   Cardiovascular: Presents suffers from irregular heart beat, syncope. Denies chest pain, palpitations, peripheral edema, Sweats. Constitutional: Denies fatigue, fever, loss of appetite, weight gain, weight loss. ENMT: Denies nose bleeds, sore throat, hearing loss. Endocrine: Denies excessive thirst, heat intolerance. Eyes: Denies loss of vision. Gastrointestinal: Presents suffers from abdominal pain, constipation, diarrhea, Bloating/gas, heartburn, nausea, stomach cramps, vomiting. Denies abdominal swelling, change in bowel habits, jaundice, rectal bleeding, dysphagia, rectal pain, Stool incontinence, hematemesis. Genitourinary: Presents suffers from frequent urination. Denies dark urine, dysuria, hematuria, incontinence. Hematologic/Lymphatic: Denies easy bruising, prolonged bleeding. Integumentary: Denies itching, rashes, sun sensitivity. Musculoskeletal: Presents suffers from arthritis, joint pain, stiffness.  Denies back pain, gout, muscle weakness. Neurological: Presents suffers from fainting. Denies dizziness, frequent headaches, memory loss. Psychiatric: Denies anxiety, depression, difficulty sleeping, hallucinations, nervousness, panic attacks, paranoia. Respiratory: Denies cough, dyspnea, wheezing. Vital Signs:   BP  (mmHg)  Pulse  (ppm) Rhythm Weight (lbs/oz) Height (ft/in) BMI Temp   125/81 69 Regular 177 / 4 5 / 8 26.95 97.5 (F)      Physical Exam:   Constitutional:      Appearance: No distress, appears comfortable. Communication: Understands/receives spoken information. Skin:      Inspection: No rash, no jaundice. Palpation: No subcutaneous nodules. Head/face: Inspection: Normacephalic, atraumatic. Palpation: normal.   Facial strength: appears normal.   Eyes:      Conjunctivae/lids: Normal.   Pupils/irises: Pupils equal, round and normal.   ENMT:      External: Normal.   Hearing: Normal.   Oropharynx: normal tongue, hard and soft palate; posterior pharynx without erythema, exudate or lesions. Neck:      Neck: Normal appearance, trachea midline. Thyroid: Normal to palpation. Respiratory:      Effort: Normal respiratory effort, comfortable, speaks in complete sentences. Auscultation: normal breath sounds, no rubs, wheezes or rhonchi. Cardiovascular: Auscultation: normal, S1 and S2, no gallops , no rubs or murmurs . Peripheral: no edema. Gastrointestinal/Abdomen:      Abdomen: non-distended, nontender. Liver/Spleen: normal, normal size, Liver size and consistency normal, spleen is non-palpable. Hernias: no hernias appreciated. Musculoskeletal:      Gait/station: antalgia gait favoring right leg surgical scar over left knee. Muscles: normal strength and tone, no atrophy or abnormal movements. Extremities:      RLE: Normal.   LLE: Normal.   Digits/Nails: Normal.   Psychiatric:      Judgment/insight: Normal, normal judgement, normal insight.    Orientation: oriented to time, space and person. Memory: normal short term memory, normal long term memory, no memory loss. Mood and affect: Normal mood, affect full, no evidence of depression, anxiety or agitation. Lymphatic:      Neck: No lymphadenopathy in the cervical or supraclavicular chain. Other: No periumbilic lymphadenopathy. Lab Results: No Electronic Results      Impressions: Melena  Kidney mass? ?Melena? ?  ? ? Kidney mass? Assessment: Will start PPI daily. F/u end of the month after f/u with ortho and urology. If melena recurs, notify us immediately. Eventual EGD/colon but need to discuss with ortho and determine next steps in the work up for the kidney mass (RCC suspected). ? Will start PPI daily. F/u end of the month after f/u with ortho and urology. If melena recurs, notify us immediately. Eventual EGD/colon but need to discuss with ortho and determine next steps in the work up for the kidney mass (RCC suspected). Plan: Education handout on BMI Healthy Weight  The patient will call back with a report on his symptoms within the next few weeks. ? ? Education handout on BMI Healthy Weight? ?  ? ? ? The patient will call back with a report on his symptoms within the next few weeks. ? ? Risk & Medical Necessity: The level of medical decision making for this visit is moderate. The number and complexity of problems addressed are moderate. The amount and/or complexity of data to be reviewed and analyzed is moderate. The risk of complications and/or morbidity or mortality of patient management is moderate. Notes: Dr. Clementina Carrasco was available in office for consultation. Lazaro Salas PA-C     Electronically signed on 2021 10:48:45 AM by JOHN Vazquez Mems.  Yaw Gauthier, MRN [id number],  1945 [facility] First Visit,

## 2021-10-29 NOTE — PROGRESS NOTES
Endoscopy discharge instructions have been reviewed and given to patient. The patient verbalized understanding and acceptance of instructions. Dr. Jermain Duff discussed with patient procedure findings and next steps.

## 2021-10-29 NOTE — DISCHARGE INSTRUCTIONS
[address] [facility] KEVIN Meadows MD  [phone]      October 29, 2021    Kristine Pool  YOB: 1945    COLONOSCOPY DISCHARGE INSTRUCTIONS    If there is redness at IV site you should apply warm compress to area. If redness or soreness persist contact Dr. Kuldip Meadows' or your primary care doctor. There may be a slight amount of blood passed from the rectum. Gaseous discomfort may develop, but walking, belching will help relieve this. You may not operate a vehicle for 12 hours  You may not operate machinery or dangerous appliances for rest of today  You may not drink alcoholic beverages for 12 hours  Avoid making any critical decisions for 24 hours    DIET:  You may resume your normal diet, but some patients find that heavy or large meals may lead to indigestion or vomiting. I suggest a light meal as first food intake. MEDICATIONS:  The use of some over-the-counter pain medication may lead to bleeding after colon biopsies or polyp removal.  Tylenol (also called acetaminophen) is safe to take even if you have had colonoscopy with polyp removal.  Based on the procedure you had today you may not safely take aspirin or aspirin-like products for the next ten (10) days. Remember that Tylenol (also called acetaminophen) is safe to take after colonoscopy even if you have had biopsies or polyps removed. ACTIVITY:  You may resume your normal household activities, but it is recommended that you spend the remainder of the day resting -  avoid any strenuous activity.     CALL DR. Susy Martinez' OFFICE IF:  Increasing pain, nausea, vomiting  Abdominal distension (swelling)  Significant new or increased bleeding (oral or rectal)  Fever/Chills  Chest pain/shortness of breath                       Additional instructions:   No aspirin 10 days, and I would actually recommend you not take aspirin going forward as there is significant inflammation in the stomach. I suspect based on the exam today that you had an ulcer in the stomach and in the duodenum previously but that appears to be healing. I want you to take an ulcer medication every morning before breakfast for 60 days and will send that prescription to your pharmacy through my office computer (pantoprazole 40mg). The colonoscopy just revealed one small polyp which we removed and I'll contact you with those results when available. It was an honor to be your doctor today. Please let me or my office staff know if you have any feedback about today's procedure. Ayo Sexton MD    Colonoscopy saves lives, and can prevent colon cancer. Everyone aged 48 or older needs colonoscopy.   Tell your family and friends: get the test!

## 2021-10-29 NOTE — PERIOP NOTES
Report from Marina Villegas 61, CRNA, see anesthesia record. ABD remains soft and non-tender post procedure. Pt has no complaints at this time and tolerated the procedure well. Endoscope was pre-cleaned at bedside immediately following procedure by Moose Pederson.

## 2021-10-29 NOTE — PROGRESS NOTES
Sana Chau  1945  [id number]    Situation:  Verbal report received from: Ian Thomas RN   Procedure: Procedure(s):  COLONOSCOPY/EGD  ESOPHAGOGASTRODUODENOSCOPY (EGD)  ESOPHAGOGASTRODUODENAL (EGD) BIOPSY  ENDOSCOPIC POLYPECTOMY    Background:    Preoperative diagnosis: MELENA/KIDNEY MASS  Postoperative diagnosis: Gastritis  Diverticulosis  Sigmoid Polyp  Hemorrhoids      :  Dr. Camejo Quiet   Assistant(s): Endoscopy Technician-1: Arturo Hagen  Endoscopy RN-1: Gustavo Moore RN    Specimens:   ID Type Source Tests Collected by Time Destination   1 : Antrum Biopsies Preservative   iSerra Maurer MD 10/29/2021 2959 Pathology   2 : Sigmoid Polyp Preservative   Sierra Maurer MD 10/29/2021 0729 Pathology     H. Pylori  no    Assessment:  Intra-procedure medications       Anesthesia gave intra-procedure sedation and medications, see anesthesia flow sheet yes    Intravenous fluids: NS@ KVO     Vital signs stable   yes    Abdominal assessment: round and soft   yes    Recommendation:  Discharge patient per MD order  yes.   Return to floor  outpatient  Family or Friend   Spouse   Permission to share finding with family or friend yes

## 2021-10-29 NOTE — ANESTHESIA POSTPROCEDURE EVALUATION
Procedure(s):  COLONOSCOPY/EGD  ESOPHAGOGASTRODUODENOSCOPY (EGD)  ESOPHAGOGASTRODUODENAL (EGD) BIOPSY  ENDOSCOPIC POLYPECTOMY. MAC    Anesthesia Post Evaluation      Multimodal analgesia: multimodal analgesia used between 6 hours prior to anesthesia start to PACU discharge  Patient location during evaluation: bedside  Patient participation: complete - patient participated  Level of consciousness: awake  Pain management: adequate  Airway patency: patent  Anesthetic complications: no  Cardiovascular status: acceptable  Respiratory status: acceptable  Hydration status: acceptable        INITIAL Post-op Vital signs:   Vitals Value Taken Time   /72 10/29/21 0748   Temp 36.5 °C (97.7 °F) 10/29/21 0733   Pulse 51 10/29/21 0751   Resp 15 10/29/21 0751   SpO2 100 % 10/29/21 0751   Vitals shown include unvalidated device data.

## 2022-03-18 PROBLEM — M17.12 PRIMARY OSTEOARTHRITIS OF LEFT KNEE: Status: ACTIVE | Noted: 2021-08-05

## 2022-04-18 NOTE — PERIOP NOTES
[address]  Endoscopy Preprocedure Instructions      1. On the day of your surgery, please report to registration located on the 2nd floor of the  [facility]. yes    2. You must have a responsible adult to drive you to the hospital, stay at the hospital during your procedure and drive you home. If they leave your procedure will not be started (no exceptions). yes    3. Do not have anything to eat or drink (including water, gum, mints, coffee, and juice) after midnight. This does not apply to the medications you were instructed to take by your physician. yes  If you are currently taking Plavix, Coumadin, Aspirin, or other blood-thinning agents, contact your physician for special instructions. yes,    4. If you are having a procedure that requires bowel prep: We recommend that you have only clear liquids the day before your procedure and begin your bowel prep by 5:00 pm.  You may continue to drink clear liquids until midnight. If for any reason you are not able to complete your prep please notify your physician immediately. not applicable    5. Have a list of all current medications, including vitamins, herbal supplements and any other over the counter medications. yes    6. If you wear glasses, contacts, dentures and/or hearing aids, they may be removed prior to procedure, please bring a case to store them in. yes    7. You should understand that if you do not follow these instructions your procedure may be cancelled. If your physical condition changes (I.e. fever, cold or flu) please contact your doctor as soon as possible. 8. It is important that you be on time.   If for any reason you are unable to keep your appointment please call [phone] the day of or your physicians office prior to your scheduled procedure

## 2022-04-20 ENCOUNTER — ANESTHESIA EVENT (OUTPATIENT)
Dept: ENDOSCOPY | Age: 77
End: 2022-04-20
Payer: MEDICARE

## 2022-04-21 NOTE — ANESTHESIA PREPROCEDURE EVALUATION
Relevant Problems   No relevant active problems       Anesthetic History   No history of anesthetic complications            Review of Systems / Medical History  Patient summary reviewed, nursing notes reviewed and pertinent labs reviewed    Pulmonary        Sleep apnea: No treatment           Neuro/Psych   Within defined limits           Cardiovascular              Pacemaker and hyperlipidemia         GI/Hepatic/Renal  Within defined limits             Comments: Kidney mass Endo/Other      Hypothyroidism       Other Findings            Physical Exam    Airway  Mallampati: I  TM Distance: 4 - 6 cm  Neck ROM: normal range of motion   Mouth opening: Normal     Cardiovascular    Rhythm: regular  Rate: normal         Dental    Dentition: Lower dentition intact and Upper dentition intact     Pulmonary  Breath sounds clear to auscultation               Abdominal         Other Findings            Anesthetic Plan    ASA: 3  Anesthesia type: general          Induction: Intravenous  Anesthetic plan and risks discussed with: Patient

## 2022-04-22 ENCOUNTER — HOSPITAL ENCOUNTER (OUTPATIENT)
Age: 77
Setting detail: OUTPATIENT SURGERY
Discharge: HOME | End: 2022-04-22
Attending: SPECIALIST | Admitting: SPECIALIST
Payer: MEDICARE

## 2022-04-22 ENCOUNTER — ANESTHESIA (OUTPATIENT)
Dept: ENDOSCOPY | Age: 77
End: 2022-04-22
Payer: MEDICARE

## 2022-04-22 ENCOUNTER — APPOINTMENT (OUTPATIENT)
Dept: GENERAL RADIOLOGY | Age: 77
End: 2022-04-22
Attending: SPECIALIST
Payer: MEDICARE

## 2022-04-22 VITALS
HEIGHT: 68 IN | BODY MASS INDEX: 27.1 KG/M2 | HEART RATE: 63 BPM | TEMPERATURE: 97.5 F | SYSTOLIC BLOOD PRESSURE: 111 MMHG | WEIGHT: 178.79 LBS | OXYGEN SATURATION: 97 % | DIASTOLIC BLOOD PRESSURE: 72 MMHG | RESPIRATION RATE: 19 BRPM

## 2022-04-22 PROCEDURE — 77030010104 HC SEAL PRT ENDOSC BYRN -B: Performed by: SPECIALIST

## 2022-04-22 PROCEDURE — 2709999900 HC NON-CHARGEABLE SUPPLY: Performed by: SPECIALIST

## 2022-04-22 PROCEDURE — 74011000250 HC RX REV CODE- 250: Performed by: NURSE ANESTHETIST, CERTIFIED REGISTERED

## 2022-04-22 PROCEDURE — 74011000636 HC RX REV CODE- 636: Performed by: SPECIALIST

## 2022-04-22 PROCEDURE — 77030021593 HC FCPS BIOP ENDOSC BSC -A: Performed by: SPECIALIST

## 2022-04-22 PROCEDURE — 77030026438 HC STYL ET INTUB CARD -A: Performed by: ANESTHESIOLOGY

## 2022-04-22 PROCEDURE — 76000 FLUOROSCOPY <1 HR PHYS/QHP: CPT

## 2022-04-22 PROCEDURE — 74011250636 HC RX REV CODE- 250/636: Performed by: NURSE ANESTHETIST, CERTIFIED REGISTERED

## 2022-04-22 PROCEDURE — C1769 GUIDE WIRE: HCPCS | Performed by: SPECIALIST

## 2022-04-22 PROCEDURE — 74011250636 HC RX REV CODE- 250/636

## 2022-04-22 PROCEDURE — 77030009038 HC CATH BILI STN RTVR BSC -C: Performed by: SPECIALIST

## 2022-04-22 PROCEDURE — 77030031656 HC FCPS ENDO GRSP DISP BSC -B: Performed by: SPECIALIST

## 2022-04-22 PROCEDURE — 77030011761 HC SPHNTOM BILI BSC -C: Performed by: SPECIALIST

## 2022-04-22 PROCEDURE — 77030010603 HC BLN DEV INFL BSC -B: Performed by: SPECIALIST

## 2022-04-22 PROCEDURE — C1726 CATH, BAL DIL, NON-VASCULAR: HCPCS | Performed by: SPECIALIST

## 2022-04-22 PROCEDURE — 77030007288 HC DEV LOK BILI BSC -A: Performed by: SPECIALIST

## 2022-04-22 PROCEDURE — 77030012596 HC SPHNTOM BILI BSC -E: Performed by: SPECIALIST

## 2022-04-22 PROCEDURE — 76060000032 HC ANESTHESIA 0.5 TO 1 HR: Performed by: SPECIALIST

## 2022-04-22 PROCEDURE — 77030008684 HC TU ET CUF COVD -B: Performed by: ANESTHESIOLOGY

## 2022-04-22 PROCEDURE — 77030013992 HC SNR POLYP ENDOSC BSC -B: Performed by: SPECIALIST

## 2022-04-22 PROCEDURE — 76040000007: Performed by: SPECIALIST

## 2022-04-22 RX ORDER — NALOXONE HYDROCHLORIDE 0.4 MG/ML
0.4 INJECTION, SOLUTION INTRAMUSCULAR; INTRAVENOUS; SUBCUTANEOUS
Status: DISCONTINUED | OUTPATIENT
Start: 2022-04-22 | End: 2022-04-22 | Stop reason: HOSPADM

## 2022-04-22 RX ORDER — LIDOCAINE HYDROCHLORIDE 20 MG/ML
INJECTION, SOLUTION EPIDURAL; INFILTRATION; INTRACAUDAL; PERINEURAL AS NEEDED
Status: DISCONTINUED | OUTPATIENT
Start: 2022-04-22 | End: 2022-04-22 | Stop reason: HOSPADM

## 2022-04-22 RX ORDER — DEXTROMETHORPHAN/PSEUDOEPHED 2.5-7.5/.8
1.2 DROPS ORAL
Status: DISCONTINUED | OUTPATIENT
Start: 2022-04-22 | End: 2022-04-22 | Stop reason: HOSPADM

## 2022-04-22 RX ORDER — SUCCINYLCHOLINE CHLORIDE 20 MG/ML
INJECTION INTRAMUSCULAR; INTRAVENOUS AS NEEDED
Status: DISCONTINUED | OUTPATIENT
Start: 2022-04-22 | End: 2022-04-22 | Stop reason: HOSPADM

## 2022-04-22 RX ORDER — EPINEPHRINE 0.1 MG/ML
INJECTION INTRACARDIAC; INTRAVENOUS
Status: COMPLETED
Start: 2022-04-22 | End: 2022-04-22

## 2022-04-22 RX ORDER — FENTANYL CITRATE 50 UG/ML
25 INJECTION, SOLUTION INTRAMUSCULAR; INTRAVENOUS AS NEEDED
Status: DISCONTINUED | OUTPATIENT
Start: 2022-04-22 | End: 2022-04-22 | Stop reason: HOSPADM

## 2022-04-22 RX ORDER — MIDAZOLAM HYDROCHLORIDE 1 MG/ML
.25-5 INJECTION, SOLUTION INTRAMUSCULAR; INTRAVENOUS AS NEEDED
Status: DISCONTINUED | OUTPATIENT
Start: 2022-04-22 | End: 2022-04-22 | Stop reason: HOSPADM

## 2022-04-22 RX ORDER — FENTANYL CITRATE 50 UG/ML
INJECTION, SOLUTION INTRAMUSCULAR; INTRAVENOUS AS NEEDED
Status: DISCONTINUED | OUTPATIENT
Start: 2022-04-22 | End: 2022-04-22 | Stop reason: HOSPADM

## 2022-04-22 RX ORDER — SODIUM CHLORIDE, SODIUM LACTATE, POTASSIUM CHLORIDE, CALCIUM CHLORIDE 600; 310; 30; 20 MG/100ML; MG/100ML; MG/100ML; MG/100ML
INJECTION, SOLUTION INTRAVENOUS
Status: DISCONTINUED | OUTPATIENT
Start: 2022-04-22 | End: 2022-04-22 | Stop reason: HOSPADM

## 2022-04-22 RX ORDER — DEXAMETHASONE SODIUM PHOSPHATE 4 MG/ML
INJECTION, SOLUTION INTRA-ARTICULAR; INTRALESIONAL; INTRAMUSCULAR; INTRAVENOUS; SOFT TISSUE AS NEEDED
Status: DISCONTINUED | OUTPATIENT
Start: 2022-04-22 | End: 2022-04-22 | Stop reason: HOSPADM

## 2022-04-22 RX ORDER — ONDANSETRON 2 MG/ML
INJECTION INTRAMUSCULAR; INTRAVENOUS AS NEEDED
Status: DISCONTINUED | OUTPATIENT
Start: 2022-04-22 | End: 2022-04-22 | Stop reason: HOSPADM

## 2022-04-22 RX ORDER — PROPOFOL 10 MG/ML
INJECTION, EMULSION INTRAVENOUS AS NEEDED
Status: DISCONTINUED | OUTPATIENT
Start: 2022-04-22 | End: 2022-04-22 | Stop reason: HOSPADM

## 2022-04-22 RX ORDER — FLUMAZENIL 0.1 MG/ML
0.2 INJECTION INTRAVENOUS
Status: DISCONTINUED | OUTPATIENT
Start: 2022-04-22 | End: 2022-04-22 | Stop reason: HOSPADM

## 2022-04-22 RX ORDER — SODIUM CHLORIDE 9 MG/ML
50 INJECTION, SOLUTION INTRAVENOUS CONTINUOUS
Status: DISCONTINUED | OUTPATIENT
Start: 2022-04-22 | End: 2022-04-22 | Stop reason: HOSPADM

## 2022-04-22 RX ADMIN — ONDANSETRON HYDROCHLORIDE 4 MG: 2 SOLUTION INTRAMUSCULAR; INTRAVENOUS at 12:15

## 2022-04-22 RX ADMIN — SODIUM CHLORIDE, POTASSIUM CHLORIDE, SODIUM LACTATE AND CALCIUM CHLORIDE: 600; 310; 30; 20 INJECTION, SOLUTION INTRAVENOUS at 12:05

## 2022-04-22 RX ADMIN — Medication 100 MG: at 12:11

## 2022-04-22 RX ADMIN — DEXAMETHASONE SODIUM PHOSPHATE 4 MG: 4 INJECTION, SOLUTION INTRAMUSCULAR; INTRAVENOUS at 12:15

## 2022-04-22 RX ADMIN — FENTANYL CITRATE 50 MCG: 50 INJECTION, SOLUTION INTRAMUSCULAR; INTRAVENOUS at 12:07

## 2022-04-22 RX ADMIN — EPINEPHRINE 0.1 MG: 0.1 INJECTION, SOLUTION INTRAVENOUS at 12:37

## 2022-04-22 RX ADMIN — LIDOCAINE HYDROCHLORIDE 60 MG: 20 INJECTION, SOLUTION INTRAVENOUS at 12:10

## 2022-04-22 RX ADMIN — PROPOFOL 200 MG: 10 INJECTION, EMULSION INTRAVENOUS at 12:10

## 2022-04-22 RX ADMIN — IOPAMIDOL 11 ML: 612 INJECTION, SOLUTION INTRAVENOUS at 12:38

## 2022-04-22 NOTE — PERIOP NOTES
Received report from Katie Brandon CRNA. See anesthesia record. Patient taken to post-recovery. Post-recovery report given to Maria Del Carmen Montes RN. Patient's ABD remains soft and non-tender post procedure. Pt has no complaints at this time and tolerated the procedure well. Endoscope was pre-cleaned at bedside immediately following procedure by Elham Freeman.

## 2022-04-22 NOTE — DISCHARGE INSTRUCTIONS
[address] [facility] KEVIN Cruz MD  [phone]      April 22, 2022     Anastacio Frame  YOB: 1945    ENDOSCOPY DISCHARGE INSTRUCTIONS    If there is redness at IV site you should apply warm compress to area. If redness or soreness persist contact Dr. Brooke Cruz' or your primary care doctor. Gaseous discomfort may develop, but walking, belching will help relieve this. You may not operate a vehicle for 12 hours  You may not operate machinery or dangerous appliances for rest of today  You may not drink alcoholic beverages for 12 hours  Avoid making any critical decisions for 24 hours    DIET:  You may resume your normal diet, but some patients find that heavy or large meals may lead to indigestion or vomiting. I suggest a light meal as first food intake. MEDICATIONS:  The use of some over-the-counter pain medication may lead to bleeding after biopsies or other procedures you may have had done. Tylenol (also called acetaminophen) is safe to take and will not lead to bleeding. Based on the procedure you had today you may not safely take aspirin or aspirin-like products for the next ten (10) days. ACTIVITY:  You may resume your normal household activities, but it is recommended that you spend the remainder of the day resting -  avoid any strenuous activity. CALL DR. Vasile Figueroa' OFFICE IF:  Increasing pain, nausea, vomiting  Abdominal distension (swelling)  Significant new or increased bleeding (oral or rectal)  Fever/Chills  Chest pain/shortness of breath                   Additional instructions:   No aspirin or advil or any blood thinning medication for 10 days. Tylenol is safe if needed. We found no stones, but there was some oozing of blood during the procedure due to the procedure. The bleeding stopped, total amount about a tablespoon.   Call or go to the ER if you have symptoms of tarry black stool or abdominal pain; which I think will be unlikely. I don't think further tests on the bile duct are needed. It was an honor to be your doctor today. Please let me or my office staff know if you have any feedback about today's procedure.     Jessica Hennessy MD

## 2022-04-22 NOTE — INTERVAL H&P NOTE
Pre-Endoscopy H&P Update  Chief complaint/HPI/ROS:  The indication for the procedure, the patient's history and the patient's current medications are reviewed prior to the procedure and that data is reported on the H&P to which this document is attached. Any significant complaints with regard to organ systems will be noted, and if not mentioned then a review of systems is not contributory. Past Medical History:   Diagnosis Date    Chronic kidney disease     mass on kidney,     COVID-19 vaccine series completed 2020    GERD (gastroesophageal reflux disease)     states resolved    High cholesterol     Hypothyroidism     Sleep apnea     bite guard, does not use      Past Surgical History:   Procedure Laterality Date    COLONOSCOPY N/A 10/29/2021    COLONOSCOPY/EGD performed by Jeanine Bales MD at [address] [facility]      HX KNEE REPLACEMENT  08/05/2021    Left    HX PACEMAKER  placed in 2021. pacemaker - medtronic. for syncope. HX VASECTOMY       Social   Social History     Tobacco Use    Smoking status: Former Smoker     Types: Cigarettes    Smokeless tobacco: Never Used   Substance Use Topics    Alcohol use: Yes     Alcohol/week: 1.0 standard drink     Types: 1 Cans of beer per week     Comment: 1 beer weekly      History reviewed. No pertinent family history. No Known Allergies   Prior to Admission Medications   Prescriptions Last Dose Informant Patient Reported? Taking? COQ10, UBIQUINOL, PO 4/21/2022 at Unknown time  Yes Yes   Sig: Take 200 mg by mouth. acetaminophen (Acetaminophen Pain Relief) 500 mg tablet 4/19/2022  No Yes   Sig: Take 2 Tablets by mouth every six (6) hours as needed for Pain.   levothyroxine (Euthyrox) 25 mcg tablet 4/21/2022 at Unknown time  Yes Yes   Sig: Take 25 mcg by mouth Daily (before breakfast). multivitamin (ONE A DAY) tablet 4/21/2022 at Unknown time  Yes Yes   Sig: Take 1 Tablet by mouth daily.    rosuvastatin (CRESTOR) 10 mg tablet 4/21/2022 at Unknown time  Yes Yes   Sig: Take 10 mg by mouth daily. Facility-Administered Medications: None       PHYSICAL EXAM:  The patient is examined immediately prior to the procedure. Visit Vitals  /81   Pulse 62   Temp 98.1 °F (36.7 °C)   Ht 5' 8\" (1.727 m)   Wt 81.1 kg (178 lb 12.7 oz)   SpO2 98%   BMI 27.19 kg/m²     Gen: Appears comfortable, no distress. Pulm: comfortable respirations with no abnormal audible breath sounds  HEART: well perfused, no abnormal audible heart sounds  GI: abdomen flat. PLAN:  Informed consent discussion held, patient afforded an opportunity to ask questions and all questions answered. After being advised of the risks, benefits, and alternatives, the patient requested that we proceed and indicated so on a written consent form. Will proceed with procedure as planned.   Tena Samuels MD

## 2022-04-22 NOTE — PROCEDURES
[address] KEVIN Rushing MD  [phone]      2022    Endoscopic Retrograde CholangioPancreatography (ERCP) Procedure Note  Rhianna Rasheed  : 1945  [address] [place] [address] Medical Record Number: [id number]      Indications:   CT scan suggested stone in bile duct, his current cardiac device makes MRCP of some risk. No biliary colic symptoms and normal CMP. PCP:  Christine Sanchez MD  Anesthesia/Sedation: General endotracheal anesthesia -- see notes  Endoscopist:   [id number]  Complications:  None  Estimated Blood Loss:  15cc. Permit:  The indications, risks, benefits and alternatives were reviewed with the patient or their decision maker who was provided an opportunity to ask questions and all questions were answered. The specific risks of endoscopic retrograde cholangiopancreatography with general endotracheal anesthesia were reviewed, including but not limited to anesthetic complication, bleeding, adverse drug reaction, missed lesion, infection, IV site reactions, intestinal perforation which would lead to the need for surgical repair, failed cannulation, and post-ERCP pancreatitis. Specific mention was made of the incidence of post-ERCP pancreatitis, estimated at 5% or 1 out of 20. The patient was advised that although most who develop post-ERCP pancreatitis have mild interstitial pancreatitis, some patients can develop severe necrotizing pancreatitis which could lead to the need for prolonged hospitalization, the need for surgery, the need for antibiotics, the need for blood products, or the potential for subsequent sepsis, multiorgan failure, or even death. The alternatives to ERCP including observation without testing, magnetic resonance cholangiopancreatography, surgery, or percutaneous cholangiopancreatography were reviewed as well as the benefits and limitations of each of the above alternatives.   After considering the options and having all their questions answered, the patient or their decision maker provided both verbal and written consent to proceed. Procedure in Detail:  After obtaining informed consent, positioning of the patient prone, and conduction of a pre-procedure pause or \"time out\" the endoscope was introduced into the mouth and advanced to the duodenum to visualize and instrument the ampullary opening. Normal appearing ampulla. We used wire guided cannulation technique to enter the CBD, as evidenced by cranial direction of wire on fluoro. Cholangiography reveals thin duct, the cystic duct fills, the gallbladder fills, but I cannot appreciate a definitive stone on fluoro. We made an endoscopic sphincterotomy with standard endocut settings, and some bleeding developed. While we observed we swept the duct with a 9mm balloon and no obvious stones seen. We noted on observation that the oozing of blood from the sphincterotomy slowed, but I called for 1:10,000 epinephrine. We washed and suctioned the field and apparent hemostasis noted. We passed the injection needle into position and I injected 1cc of 1:10,000 epinephrine into the 9 o'clock position (well removed from the PD opening) to ensure ongoing hemostasis. At the end of the procedure there was no further bleeding, all instruments and devices removed. EBL 15cc      Specimens: none           Impression: No apparent choledocholithiasis. Scant bleeding after sphincterotomy with hemostasis confirmed at end of case. Recommendations: Ok to return home, return to ER if evidence of bleeding or pancreatitis. Further GI testing only in response to symptoms or evidence of gastrointestinal process. Thank you for entrusting me with this patient's care. Please do not hesitate to contact me with any questions or if I can be of assistance with any of your other patients' GI needs.   Signed By: Gregory Yu MD                        April 22, 2022     Surgical assistant none. Implants none unless specified.

## 2022-04-22 NOTE — PROGRESS NOTES
Sequential Compression Device and sleeves removed at this time. Extremities assessed, no change from pre SCD placement. Patient tolerated SCD with no problems noted and has no complaints at this time.

## 2022-04-22 NOTE — PROGRESS NOTES
Endoscopy discharge instructions have been reviewed and given to patient. The patient verbalized understanding and acceptance of instructions. Dr. Edgar Benson discussed with patient/spouse procedure findings and next steps.

## 2022-04-22 NOTE — H&P
His cardiac device not MR compliant. ERCP scheduled for stone in bile duct. Date: 3/17/2022 2:00 PM  Patient Name: Charu Langley  Account #: [id number]  Gender: Male   (age): 1945 (67)  Provider:    Nena Woods. Mckayla Irvin MD     Referring Physician:    Danielle Turner MD  [address]  [phone] (phone)  [phone] (fax)    Estelle Mirza. Jammie Lam MD  [address], [address] [facility], [address]  [phone] (phone)  [phone] (fax)    Pushpa Ingram MD  [address] [facility]. [facility]  [phone] (phone)  [phone] (fax)    Misha Contreras. [address], [address] [facility] [address]  [phone] (phone)  [phone] (fax)     Chief Complaint:    Verónica oCpeland in bile duct on CT. History of Present Illness:  67 M with recent hospitalization for GI blood loss, EGD and colonoscopy performed revealing NSAID gastroenteropathy, no alarm lesion of colon, no bleeding since. Inicidental identification of renal lesion on CT during that admission led to referral to Dr. Jammie Lam who repeated cross sectional imaging and incidental identifiation of a 4mm stone in the distal duct noted. Interestingly, no stone seen on CT at the hospital 10/2021, and no stones noted in bile duct. Further counfounding me is the fact that he has no hepatobiliary symptoms whatsoever, no biliary colic, has no symptoms suggestive of pancreatitis, no bilirubiburia or jaundice. Ordinarily I'd suggest labs and MRCP but he has a pacemaker making concern for MRI compliance. I've offered direct ERCP for diagnosis, potential treatment of stone in bile duct but when we discussed the risks of bleeding, failed procedure, and pancreatitis (5% risk even if the procedure is facile) he and I negotiated that I'd reach out to Dr. Silvino Chapa to find out if his pacemaker is MRI compliant (or conditional).   If conditional I'll need to confer with an MR radiologist to see if a reduced strength magnet will get the information needed. If his pacemaker is not MRI complaint, if his lab tests are abnormal (CMP), if he develops symptoms in the interim, or if MRCP not possible with the reduced strength magnet (or such a scanner not available locally) I think the risk / benefit of ERCP would be justified. Past Medical History  Medical Conditions:   Arthritis  Cardiac implant/device  Hemorrhoids  High cholesterol  Sleep apnea  Dx Studies:   Colonoscopy, 5/20/2016  CT Scan, 8/17/2021  EGD/Colonoscopy, 10/29/2021  Medications:   coenzyme Q10 200 mg Take 1 capsule by mouth once a day  dutasteride 0.5 mg Take 1 capsule by mouth once a day as directed  levothyroxine 175 mcg Take 1 tablet by mouth once a day as directed  One-A-Day Men's 50 Plus 400- mcg Take 1 tablet by mouth once a day  rosuvastatin 10 mg Take 1 tablet by mouth once a day as directed  Allergies:   Patient has no known allergies or drug allergies  Immunizations:   Influenza, seasonal, injectable (refused)  Influenza, seasonal, injectable (refused)  Social History  Alcohol:   Alcohol consumption: Weekly 3 times a week. Tobacco:   Former smoker  Drugs:   None  Exercise:   Exercise 3 or more times a week 3 times a week. Caffeine:   Daily. Marital Status:         Occupation:    Retired     Family History   No history of Colon Cancer, Esophogeal Cancer, GI Cancers, IBD (Crohn's or UC), Liver disease  Sister: Diagnosed with Family history of colon polyps; Review of Systems:  Cardiovascular: Denies chest pain, irregular heart beat, palpitations, peripheral edema, syncope, Sweats. Constitutional: Denies fatigue, fever, loss of appetite, weight gain, weight loss. ENMT: Denies nose bleeds, sore throat, hearing loss. Endocrine: Denies excessive thirst, heat intolerance. Eyes: Denies loss of vision.   Gastrointestinal: Denies abdominal pain, abdominal swelling, change in bowel habits, constipation, diarrhea, Bloating/gas, heartburn, jaundice, nausea, rectal bleeding, stomach cramps, vomiting, dysphagia, rectal pain, Stool incontinence, hematemesis. Genitourinary: Denies dark urine, dysuria, frequent urination, hematuria, incontinence. Hematologic/Lymphatic: Denies easy bruising, prolonged bleeding. Integumentary: Denies itching, rashes, sun sensitivity. Musculoskeletal: Denies arthritis, back pain, gout, joint pain, muscle weakness, stiffness. Neurological: Denies dizziness, fainting, frequent headaches, memory loss. Psychiatric: Denies anxiety, depression, difficulty sleeping, hallucinations, nervousness, panic attacks, paranoia. Respiratory: Denies cough, dyspnea, wheezing. Vital Signs:  Height (ft/in) Temp  5 / 8 97 (F)  Physical Exam:  Constitutional:  Appearance: No distress, appears comfortable. Communication: Understands/receives spoken information. Skin:  Inspection: No rash, no jaundice. Head/face: Inspection: Normacephalic, atraumatic. Eyes:  Conjunctivae/lids: Normal.  Pupils/irises: Pupils equal, round and normal.  ENMT:  External: Normal.  Hearing: Normal.  Neck:  Neck: Normal appearance, trachea midline. Jugular veins: No JVD noted. Respiratory:  Effort: Normal respiratory effort, comfortable, speaks in complete sentences. Lab Results:   Test 10/27/2021 Units Limits  Sars-Cov-2     SARS CoV2 Not detected  NOTD  SARS CoV2 Source Nasopharyngeal    Impressions:   Kidney mass  Abnormal findings on diagnostic imaging of other parts of digestive tract  Plan:   Comp Metabolic Panel ([facility] #[id number])  I will call Dr. Radha Villalobos office for information about his pacemaker. Risk & Medical Necessity:    The level of medical decision making for this visit is low. The number and complexity of problems addressed are low. The risk of complications and/or morbidity or mortality of patient management is low. Malik Cano. Alexia Chavez MD    Electronically signed on 3/17/2022 2:37:53 PM by Malik Cano.  Alexia Chavez MD Berto Arana.  Viola Mccrary, MRN [id number],  1945 Follow Up,

## 2022-04-22 NOTE — PROGRESS NOTES
Deanne Renteria  1945  [id number]    Situation:  Verbal report received from: Kaila Miller RN  Procedure: Procedure(s):  ENDOSCOPIC RETROGRADE CHOLANGIOPANCREATOGRAPHY (ERCP)with fluoro. check for device orders  SPHINCTEROTOMY  ENDOSCOPIC STONE EXTRACTION/BALLOON SWEEP    Background:    Preoperative diagnosis: Calculus of bile duct without cholecystitis and without obstruction [K80.50]  Postoperative diagnosis: 1- hiatal hernia      :  Dr. Long Cabral  Assistant(s): Endoscopy Technician-1: Kenny Hewitt  Endoscopy RN-1: Noa Theodore    Specimens: * No specimens in log *  H. Pylori  no    Assessment:  Intra-procedure medications     Anesthesia gave intra-procedure sedation and medications, see anesthesia flow sheet yes    Intravenous fluids: NS@ KVO     Vital signs stable yes    Abdominal assessment: round and soft yes    Recommendation:  Discharge patient per MD order yes.   Return to floor na  Family or Friend family  Permission to share finding with family or friend yes

## 2022-04-25 NOTE — ANESTHESIA POSTPROCEDURE EVALUATION
Procedure(s):  ENDOSCOPIC RETROGRADE CHOLANGIOPANCREATOGRAPHY (ERCP)with fluoro. check for device orders  SPHINCTEROTOMY  ENDOSCOPIC STONE EXTRACTION/BALLOON SWEEP.     MAC    Anesthesia Post Evaluation        Patient location during evaluation: PACU  Level of consciousness: awake  Pain management: adequate  Airway patency: patent  Anesthetic complications: no  Cardiovascular status: acceptable  Respiratory status: acceptable  Hydration status: acceptable  Post anesthesia nausea and vomiting:  none      INITIAL Post-op Vital signs:   Vitals Value Taken Time   /72 04/22/22 1315   Temp 36.4 °C (97.5 °F) 04/22/22 1250   Pulse 63 04/22/22 1315   Resp 19 04/22/22 1315   SpO2 97 % 04/22/22 1315

## (undated) DEVICE — BAG SPEC BIOHZRD 10 X 10 IN --

## (undated) DEVICE — DRAPE,EXTREMITY,89X128,STERILE: Brand: MEDLINE

## (undated) DEVICE — RETRIEVAL BALLOON CATHETER: Brand: EXTRACTOR™ PRO RX

## (undated) DEVICE — ELECTRODE BLDE L4IN NONINSULATED EDGE

## (undated) DEVICE — SET GRAV CK VLV NEEDLESS ST 3 GANGED 4WAY STPCOCK HI FLO 10

## (undated) DEVICE — INFLATION DEVICE: Brand: ENCORE 26

## (undated) DEVICE — POSITIONER HD REST SUPINE LAT

## (undated) DEVICE — PADDING CST 6IN STERILE --

## (undated) DEVICE — 1200 GUARD II KIT W/5MM TUBE W/O VAC TUBE: Brand: GUARDIAN

## (undated) DEVICE — POLYP TRAP: Brand: TRAPEASE®

## (undated) DEVICE — GLOVE SURG SZ 9 L12IN FNGR THK79MIL GRN LTX FREE

## (undated) DEVICE — GLOVE SURG SZ 85 L12IN FNGR THK79MIL GRN LTX FREE

## (undated) DEVICE — SIMPLICITY FLUFF UNDERPAD 23X36, MODERATE: Brand: SIMPLICITY

## (undated) DEVICE — DRESSING ANTIMIC FOAM OPTIFOAM POSTOP ADH 4X14 IN

## (undated) DEVICE — BAG BELONG PT PERS CLEAR HANDL

## (undated) DEVICE — SUTURE VCRL + SZ 1-0 L36IN ABSRB UD CTX 1/2 CIR TAPR PNT VCP977H

## (undated) DEVICE — (D)FCPS GRSP 9MM 230CMLX2.4MM -- DISC BY MFR

## (undated) DEVICE — CANN NASAL O2 CAPNOGRAPHY AD -- FILTERLINE

## (undated) DEVICE — ELECTRODE,RADIOTRANSLUCENT,FOAM,3PK: Brand: MEDLINE

## (undated) DEVICE — COVER LT HNDL PLAS RIG 1 PER PK

## (undated) DEVICE — XN CAL 1X3.0ML: Brand: XN CAL

## (undated) DEVICE — CATH IV AUTOGRD BC PNK 20GA 25 -- INSYTE

## (undated) DEVICE — BALLOON DILATATION CATHETER: Brand: HURRICANE™ RX

## (undated) DEVICE — SOLIDIFIER MEDC 1200ML -- CONVERT TO 356117

## (undated) DEVICE — CUFF RMFG BP INF SZ 11 DISP -- LAWSON OEM ITEM 238915

## (undated) DEVICE — DUAL IRRIGATION ADAPTOR

## (undated) DEVICE — STRYKER PERFORMANCE SERIES SAGITTAL BLADE: Brand: STRYKER PERFORMANCE SERIES

## (undated) DEVICE — GUIDEWIRE ENDOSCP WRK L450CM DIA0.035IN STD SHFT STR RND

## (undated) DEVICE — PREP KIT PEEL PTCH POVIDONE IOD

## (undated) DEVICE — CANNULA CUSH AD W/ 14FT TBG

## (undated) DEVICE — GLOVE SURG SZ 85 L12IN FNGR ORTHO 126MIL CRM LTX FREE

## (undated) DEVICE — MARKER RAD KNEE TIB CKPT STEREOTAXIC IMAG LESION LOC

## (undated) DEVICE — NDL PRT INJ NSAF BLNT 18GX1.5 --

## (undated) DEVICE — SPHINCTEROTOME: Brand: DREAMTOME™ RX 44

## (undated) DEVICE — BITEBLOCK ENDOSCP 60FR MAXI WHT POLYETH STURDY W/ VELC WVN

## (undated) DEVICE — Device: Brand: ENDO SMARTCAP

## (undated) DEVICE — Device

## (undated) DEVICE — (D)SENSOR RMFG 02 PULS OXMTR -- DISC BY MFR USE ITEM 133445

## (undated) DEVICE — SUTURE MCRYL SZ 3-0 L27IN ABSRB UD L24MM PS-1 3/8 CIR PRIM Y936H

## (undated) DEVICE — SOLUTION IRRIG 3000ML 0.9% SOD CHL USP UROMATIC PLAS CONT

## (undated) DEVICE — KIT,1200CC CANISTER,3/16"X6' TUBING: Brand: MEDLINE INDUSTRIES, INC.

## (undated) DEVICE — KIT PROC KNE TRACKING PK/1 -- VIZADISC MAKO

## (undated) DEVICE — HANDPIECE SET WITH COAXIAL HIGH FLOW TIP AND SUCTION TUBE: Brand: INTERPULSE

## (undated) DEVICE — SUTURE STRATAFIX SYMMETRIC SZ 1 L18IN ABSRB VLT CT1 L36CM SXPP1A404

## (undated) DEVICE — SOLUTION IRRIG 1000ML STRL H2O USP PLAS POUR BTL

## (undated) DEVICE — FORCEPS BX L240CM JAW DIA2.8MM L CAP W/ NDL MIC MESH TOOTH

## (undated) DEVICE — MARKER RAD KNEE FEM CKPT STEREOTAXIC IMAG LESION LOC

## (undated) DEVICE — KIT DRP FOR RIO ROBOTIC ARM ASST SYS

## (undated) DEVICE — GLOVE ORANGE PI 8 1/2   MSG9085

## (undated) DEVICE — COVER,MAYO STAND,STERILE: Brand: MEDLINE

## (undated) DEVICE — SYR 50ML SLIP TIP NSAF LF STRL --

## (undated) DEVICE — CONTAINER SPEC 20 ML LID NEUT BUFF FORMALIN 10 % POLYPR STS

## (undated) DEVICE — BLADE SURG SAW STD S STL OSC W/ SERR EDGE DISP

## (undated) DEVICE — ENDO CARRY-ON PROCEDURE KIT INCLUDES ENZYMATIC SPONGE, GAUZE, BIOHAZARD LABEL, TRAY, LUBRICANT, DIRTY SCOPE LABEL, WATER LABEL, TRAY, DRAWSTRING PAD, AND DEFENDO 4-PIECE KIT.: Brand: ENDO CARRY-ON PROCEDURE KIT

## (undated) DEVICE — BW-412T DISP COMBO CLEANING BRUSH: Brand: SINGLE USE COMBINATION CLEANING BRUSH

## (undated) DEVICE — SUTURE VCRL SZ 2-0 L36IN ABSRB UD L36MM CT-1 1/2 CIR J945H

## (undated) DEVICE — SET ADMIN 16ML TBNG L100IN 2 Y INJ SITE IV PIGGY BK DISP

## (undated) DEVICE — MARKER,SKIN,WI/RULER AND LABELS: Brand: MEDLINE

## (undated) DEVICE — 1010 S-DRAPE TOWEL DRAPE 10/BX: Brand: STERI-DRAPE™

## (undated) DEVICE — NEEDLE HYPO 18GA L1.5IN PNK S STL HUB POLYPR SHLD REG BVL

## (undated) DEVICE — BANDAGE COMPR W6INXL10YD ST M E WHITE/BEIGE

## (undated) DEVICE — GUIDEWIRE LOCKING DEVICE BIOPSY CAP

## (undated) DEVICE — DECANTER BAG 9": Brand: MEDLINE INDUSTRIES, INC.

## (undated) DEVICE — BLOCK BITE BLOX W DENTAL RIM --

## (undated) DEVICE — TRIPLE LUMEN NEEDLE KNIFE: Brand: RX NEEDLE KNIFE XL

## (undated) DEVICE — SPONGE GZ W4XL4IN COT 12 PLY TYP VII WVN C FLD DSGN

## (undated) DEVICE — TAPE,CLOTH/SILK,CURAD,3"X10YD,LF,40/CS: Brand: CURAD

## (undated) DEVICE — GOWN,SIRUS,NONRNF,SETINSLV,2XL,18/CS: Brand: MEDLINE

## (undated) DEVICE — TUBING, SUCTION, 1/4" X 10', STRAIGHT: Brand: MEDLINE

## (undated) DEVICE — REM POLYHESIVE ADULT PATIENT RETURN ELECTRODE: Brand: VALLEYLAB

## (undated) DEVICE — HOOD: Brand: FLYTE

## (undated) DEVICE — PENCIL SMK EVAC L10FT DIA95MM TBNG NONSTICK W ADPT TO 22MM

## (undated) DEVICE — SYRINGE 50ML E/T

## (undated) DEVICE — SYR LR LCK 1ML GRAD NSAF 30ML --

## (undated) DEVICE — TOTAL JOINT-SFMC: Brand: MEDLINE INDUSTRIES, INC.

## (undated) DEVICE — KIT COLON W/ 1.1OZ LUB AND 2 END

## (undated) DEVICE — DEVICE LCK BILI RAP EXCHG OLPS --

## (undated) DEVICE — SNARE ENDOSCP M L240CM W27MM SHTH DIA2.4MM CHN 2.8MM OVL